# Patient Record
Sex: MALE | Race: OTHER | HISPANIC OR LATINO | URBAN - METROPOLITAN AREA
[De-identification: names, ages, dates, MRNs, and addresses within clinical notes are randomized per-mention and may not be internally consistent; named-entity substitution may affect disease eponyms.]

---

## 2021-06-20 ENCOUNTER — EMERGENCY (EMERGENCY)
Facility: HOSPITAL | Age: 56
LOS: 1 days | Discharge: HOME | End: 2021-06-20
Attending: EMERGENCY MEDICINE | Admitting: EMERGENCY MEDICINE
Payer: COMMERCIAL

## 2021-06-20 VITALS
DIASTOLIC BLOOD PRESSURE: 84 MMHG | WEIGHT: 141.1 LBS | HEART RATE: 106 BPM | RESPIRATION RATE: 18 BRPM | HEIGHT: 63 IN | OXYGEN SATURATION: 99 % | SYSTOLIC BLOOD PRESSURE: 126 MMHG | TEMPERATURE: 98 F

## 2021-06-20 VITALS — TEMPERATURE: 99 F

## 2021-06-20 DIAGNOSIS — R10.30 LOWER ABDOMINAL PAIN, UNSPECIFIED: ICD-10-CM

## 2021-06-20 DIAGNOSIS — E78.5 HYPERLIPIDEMIA, UNSPECIFIED: ICD-10-CM

## 2021-06-20 DIAGNOSIS — Z87.442 PERSONAL HISTORY OF URINARY CALCULI: ICD-10-CM

## 2021-06-20 DIAGNOSIS — Z20.822 CONTACT WITH AND (SUSPECTED) EXPOSURE TO COVID-19: ICD-10-CM

## 2021-06-20 DIAGNOSIS — M54.6 PAIN IN THORACIC SPINE: ICD-10-CM

## 2021-06-20 DIAGNOSIS — B34.9 VIRAL INFECTION, UNSPECIFIED: ICD-10-CM

## 2021-06-20 DIAGNOSIS — R50.9 FEVER, UNSPECIFIED: ICD-10-CM

## 2021-06-20 DIAGNOSIS — R30.0 DYSURIA: ICD-10-CM

## 2021-06-20 LAB
ALBUMIN SERPL ELPH-MCNC: 4 G/DL — SIGNIFICANT CHANGE UP (ref 3.3–5)
ALP SERPL-CCNC: 100 U/L — SIGNIFICANT CHANGE UP (ref 40–120)
ALT FLD-CCNC: 17 U/L — SIGNIFICANT CHANGE UP (ref 10–45)
ANION GAP SERPL CALC-SCNC: 13 MMOL/L — SIGNIFICANT CHANGE UP (ref 5–17)
APPEARANCE UR: CLEAR — SIGNIFICANT CHANGE UP
AST SERPL-CCNC: 19 U/L — SIGNIFICANT CHANGE UP (ref 10–40)
BACTERIA # UR AUTO: PRESENT /HPF
BASOPHILS # BLD AUTO: 0.02 K/UL — SIGNIFICANT CHANGE UP (ref 0–0.2)
BASOPHILS NFR BLD AUTO: 0.2 % — SIGNIFICANT CHANGE UP (ref 0–2)
BILIRUB SERPL-MCNC: 0.4 MG/DL — SIGNIFICANT CHANGE UP (ref 0.2–1.2)
BILIRUB UR-MCNC: NEGATIVE — SIGNIFICANT CHANGE UP
BUN SERPL-MCNC: 12 MG/DL — SIGNIFICANT CHANGE UP (ref 7–23)
CALCIUM SERPL-MCNC: 8 MG/DL — LOW (ref 8.4–10.5)
CHLORIDE SERPL-SCNC: 102 MMOL/L — SIGNIFICANT CHANGE UP (ref 96–108)
CO2 SERPL-SCNC: 21 MMOL/L — LOW (ref 22–31)
COLOR SPEC: YELLOW — SIGNIFICANT CHANGE UP
COMMENT - URINE: SIGNIFICANT CHANGE UP
CREAT SERPL-MCNC: 0.83 MG/DL — SIGNIFICANT CHANGE UP (ref 0.5–1.3)
DIFF PNL FLD: ABNORMAL
EOSINOPHIL # BLD AUTO: 0.02 K/UL — SIGNIFICANT CHANGE UP (ref 0–0.5)
EOSINOPHIL NFR BLD AUTO: 0.2 % — SIGNIFICANT CHANGE UP (ref 0–6)
EPI CELLS # UR: SIGNIFICANT CHANGE UP /HPF (ref 0–5)
GLUCOSE SERPL-MCNC: 110 MG/DL — HIGH (ref 70–99)
GLUCOSE UR QL: NEGATIVE — SIGNIFICANT CHANGE UP
HCT VFR BLD CALC: 40.5 % — SIGNIFICANT CHANGE UP (ref 39–50)
HGB BLD-MCNC: 13.1 G/DL — SIGNIFICANT CHANGE UP (ref 13–17)
IMM GRANULOCYTES NFR BLD AUTO: 0.4 % — SIGNIFICANT CHANGE UP (ref 0–1.5)
KETONES UR-MCNC: NEGATIVE — SIGNIFICANT CHANGE UP
LEUKOCYTE ESTERASE UR-ACNC: NEGATIVE — SIGNIFICANT CHANGE UP
LIDOCAIN IGE QN: 26 U/L — SIGNIFICANT CHANGE UP (ref 7–60)
LYMPHOCYTES # BLD AUTO: 1.04 K/UL — SIGNIFICANT CHANGE UP (ref 1–3.3)
LYMPHOCYTES # BLD AUTO: 9.9 % — LOW (ref 13–44)
MCHC RBC-ENTMCNC: 29 PG — SIGNIFICANT CHANGE UP (ref 27–34)
MCHC RBC-ENTMCNC: 32.3 GM/DL — SIGNIFICANT CHANGE UP (ref 32–36)
MCV RBC AUTO: 89.6 FL — SIGNIFICANT CHANGE UP (ref 80–100)
MONOCYTES # BLD AUTO: 0.38 K/UL — SIGNIFICANT CHANGE UP (ref 0–0.9)
MONOCYTES NFR BLD AUTO: 3.6 % — SIGNIFICANT CHANGE UP (ref 2–14)
NEUTROPHILS # BLD AUTO: 9.01 K/UL — HIGH (ref 1.8–7.4)
NEUTROPHILS NFR BLD AUTO: 85.7 % — HIGH (ref 43–77)
NITRITE UR-MCNC: NEGATIVE — SIGNIFICANT CHANGE UP
NRBC # BLD: 0 /100 WBCS — SIGNIFICANT CHANGE UP (ref 0–0)
PH UR: 6 — SIGNIFICANT CHANGE UP (ref 5–8)
PLATELET # BLD AUTO: 157 K/UL — SIGNIFICANT CHANGE UP (ref 150–400)
POTASSIUM SERPL-MCNC: 3.6 MMOL/L — SIGNIFICANT CHANGE UP (ref 3.5–5.3)
POTASSIUM SERPL-SCNC: 3.6 MMOL/L — SIGNIFICANT CHANGE UP (ref 3.5–5.3)
PROT SERPL-MCNC: 6.7 G/DL — SIGNIFICANT CHANGE UP (ref 6–8.3)
PROT UR-MCNC: ABNORMAL MG/DL
RBC # BLD: 4.52 M/UL — SIGNIFICANT CHANGE UP (ref 4.2–5.8)
RBC # FLD: 13 % — SIGNIFICANT CHANGE UP (ref 10.3–14.5)
RBC CASTS # UR COMP ASSIST: < 5 /HPF — SIGNIFICANT CHANGE UP
SARS-COV-2 RNA SPEC QL NAA+PROBE: NEGATIVE — SIGNIFICANT CHANGE UP
SODIUM SERPL-SCNC: 136 MMOL/L — SIGNIFICANT CHANGE UP (ref 135–145)
SP GR SPEC: >=1.03 — SIGNIFICANT CHANGE UP (ref 1–1.03)
UROBILINOGEN FLD QL: 0.2 E.U./DL — SIGNIFICANT CHANGE UP
WBC # BLD: 10.51 K/UL — HIGH (ref 3.8–10.5)
WBC # FLD AUTO: 10.51 K/UL — HIGH (ref 3.8–10.5)
WBC UR QL: < 5 /HPF — SIGNIFICANT CHANGE UP

## 2021-06-20 PROCEDURE — 99284 EMERGENCY DEPT VISIT MOD MDM: CPT

## 2021-06-20 PROCEDURE — 85025 COMPLETE CBC W/AUTO DIFF WBC: CPT

## 2021-06-20 PROCEDURE — 99284 EMERGENCY DEPT VISIT MOD MDM: CPT | Mod: 25

## 2021-06-20 PROCEDURE — 96374 THER/PROPH/DIAG INJ IV PUSH: CPT | Mod: XU

## 2021-06-20 PROCEDURE — 80053 COMPREHEN METABOLIC PANEL: CPT

## 2021-06-20 PROCEDURE — 96361 HYDRATE IV INFUSION ADD-ON: CPT

## 2021-06-20 PROCEDURE — 87086 URINE CULTURE/COLONY COUNT: CPT

## 2021-06-20 PROCEDURE — 87635 SARS-COV-2 COVID-19 AMP PRB: CPT

## 2021-06-20 PROCEDURE — 36415 COLL VENOUS BLD VENIPUNCTURE: CPT

## 2021-06-20 PROCEDURE — 74177 CT ABD & PELVIS W/CONTRAST: CPT

## 2021-06-20 PROCEDURE — 74177 CT ABD & PELVIS W/CONTRAST: CPT | Mod: 26,MA

## 2021-06-20 PROCEDURE — 81001 URINALYSIS AUTO W/SCOPE: CPT

## 2021-06-20 PROCEDURE — 83690 ASSAY OF LIPASE: CPT

## 2021-06-20 RX ORDER — ACETAMINOPHEN 500 MG
650 TABLET ORAL ONCE
Refills: 0 | Status: COMPLETED | OUTPATIENT
Start: 2021-06-20 | End: 2021-06-20

## 2021-06-20 RX ORDER — SODIUM CHLORIDE 9 MG/ML
1000 INJECTION INTRAMUSCULAR; INTRAVENOUS; SUBCUTANEOUS ONCE
Refills: 0 | Status: COMPLETED | OUTPATIENT
Start: 2021-06-20 | End: 2021-06-20

## 2021-06-20 RX ORDER — ONDANSETRON 8 MG/1
4 TABLET, FILM COATED ORAL ONCE
Refills: 0 | Status: COMPLETED | OUTPATIENT
Start: 2021-06-20 | End: 2021-06-20

## 2021-06-20 RX ORDER — IOHEXOL 300 MG/ML
30 INJECTION, SOLUTION INTRAVENOUS ONCE
Refills: 0 | Status: COMPLETED | OUTPATIENT
Start: 2021-06-20 | End: 2021-06-20

## 2021-06-20 RX ADMIN — SODIUM CHLORIDE 2000 MILLILITER(S): 9 INJECTION INTRAMUSCULAR; INTRAVENOUS; SUBCUTANEOUS at 20:54

## 2021-06-20 RX ADMIN — Medication 650 MILLIGRAM(S): at 22:53

## 2021-06-20 RX ADMIN — Medication 650 MILLIGRAM(S): at 23:25

## 2021-06-20 RX ADMIN — ONDANSETRON 4 MILLIGRAM(S): 8 TABLET, FILM COATED ORAL at 20:54

## 2021-06-20 RX ADMIN — SODIUM CHLORIDE 1000 MILLILITER(S): 9 INJECTION INTRAMUSCULAR; INTRAVENOUS; SUBCUTANEOUS at 22:00

## 2021-06-20 RX ADMIN — IOHEXOL 30 MILLILITER(S): 300 INJECTION, SOLUTION INTRAVENOUS at 20:54

## 2021-06-20 NOTE — ED PROVIDER NOTE - NSFOLLOWUPINSTRUCTIONS_ED_ALL_ED_FT
Fiebre en adultos    LO QUE NECESITA SABER:    ¿Qué es la fiebre?La fiebre es un aumento en la temperatura corporal. La temperatura normal del cuerpo es de 98.6°F (37°C). La temperatura se considera tremayne fiebre cuando alcanza más 100.4°F (38°C).    ¿Cuáles son las causas comunes de la fiebre?La causa de la fiebre podría desconocerse. A esto se le llama fiebre de origen desconocido. Ocurre cuando usted tiene fiebre por encima de los 100.9°F (38.3°C) por 3 semanas o más. Los siguientes son causas comunes de la fiebre:   •Tremayne infección causada por un virus o tremayne bacteria.      •Un trastornos inflamatorio, gerardo la artritis.      •Tremayne infección o tremayne lesión cerebral.      •Consumo de alcohol o drogas ilegales, o abstinencia.      ¿Qué otros signos y síntomas podría tener?  •Escalofríos y estremecimientos      •Rigidez muscular      •Pérdida de peso      •Sudoración nocturna      •Fiebre que viene y va      •Fiebre más tamera por las mañanas      ¿Cómo se diagnostica la causa de la fiebre?Juan médico le preguntará cuando empezó la fiebre y qué delgado tamera estuvo. Él le hará preguntas sobre otros síntomas y lo examinará para detectar signos de infección. [place] juan baldev para buscar protuberancias y escuchará juan corazón y rose marie pulmones. Infórmele al médico si se ha sometido a tremayne cirugía o ha tenido alguna infección recientemente. Comuníquele también si padece alguna afección médica, gerardo diabetes o artritis. Es posible que usted necesite exámenes de calvin o de orina para determinar si tiene tremayne infección. Pregunte por otros exámenes que usted podría necesitar si rose marie exámenes de calvin y de orina no explican la causa de juan fiebre.    ¿Cómo se trata la fiebre?Es probable que usted necesite alguno de los siguientes, según la causa de la fiebre:   •Los MAYNOR,gerardo el ibuprofeno, ayudan a disminuir la inflamación, el dolor y la fiebre. Tory medicamento está disponible con o sin tremayne receta médica. Los MAYNOR pueden causar sangrado estomacal o problemas renales en ciertas personas. Si usted esta tomando un anticoágulante,  siempre pregunte si los AINEs son seguros para usted. Siempre otilio la etiqueta de tory medicamento y siga las instrucciones. No administre tory medicamento a niños menores de 6 meses de lena sin antes obtener la autorización de juan médico.      •Acetaminofénalivia el dolor y baja la fiebre. Está disponible sin receta médica. Pregunte la cantidad y la frecuencia con que debe tomarlos. Siga las indicaciones. Otilio las etiquetas de todos los demás medicamentos que esté usando para saber si también contienen acetaminofén, o pregunte a juan médico o farmacéutico. El acetaminofén puede causar daño en el hígado cuando no se boom de forma correcta. No use más de 4 gramos (4000 miligramos) en total de acetaminofeno en un día.      •Los antibióticosse puede administrar si tiene tremayne infección a causa de alguna bacteria.      ¿Qué puedo hacer para sentirme mejor cuando tengo fiebre?  •[place] más líquidos gerardo se le indique.La fiebre lo hace sudar. [place] puede aumentar juan riesgo de presentar deshidratación. Los líquidos pueden ayudar a evitar la deshidratación. ?Annalise por lo menos de 6 a 8 vasos de ocho onzas de líquidos myrna cada día. Annalise agua, jugo o caldo. No annalise bebidas deportivas. Pueden contener cafeína.      ?Es posible que necesite tremayne solución de sales de rehidratación oral (SRO o chato oral). Tremayne SRO tiene las cantidades exactas de agua, sal y azúcar que usted necesita para reemplazar los líquidos corporales.      •Vístase con ropa ligera y fresca.Los temblores podrían ser tremayne señal de que la fiebre está aumentando. No ponga más cobijas encima de usted ni se abrigue más. [place] podría provocar que le suba la fiebre aún más. Vístase con ropa liviana y cómoda. Utilice tremayne manta liviana o tremayne sábana al dormir. Cambie juan ropa, las cobijas o las sábanas si se mojan.      •Refrésquese de manera brar.Báñese con agua tibia o fresca. Use tremayne bolsa de hielo envuelta en tremayne toalla pequeña o moje un paño con agua fría. Coloque la compresa de hielo o pañuelo húmedo sobre juan frente o en la parte posterior del baldev.      ¿Cuándo nehal buscar atención inmediata?  •Juan fiebre no desaparece, o empeora aún después del tratamiento.      •Usted tiene el baldev rígido y mucho dolor de malcolm.      •Usted está confundido. Es probable que no pueda pensar claramente o recordar cosas de la manera habitual.      •Juan corazón late más rápido de lo normal incluso después del tratamiento.      •Usted tiene falta de aliento o tiene dolor de pecho cuando respira.      •Usted orina pequeñas cantidades o no orina nada en absoluto.      •Juan piel, labios o uñas se ponen azules.      ¿Cuándo nehal comunicarme con mi médico?  •Usted tiene dolor en el abdomen o se siente hinchado.      •Tiene náuseas o está vomitando.      •Siente dolor o ardor cuando orina o tiene dolor en la espalda.      •Usted tiene preguntas o inquietudes acerca de juan condición o cuidado.

## 2021-06-20 NOTE — ED PROVIDER NOTE - OBJECTIVE STATEMENT
54 y/o M w/ PMHx of border HLD, and prior kidney stones presents to the ED for evaluation after 2 days of subjective fevers and chills, headache, upper back pain, lower abdominal pain, some dysuria, and diffuse myalgia. Denies any urinary frequency, and hematuria. Pt states that at times has intermittent abdominal pain diffusely favoring right side. Pt is concerned that he is having recurrent kidney stones onset of being sick. Pt states that he has never had this before and denies upper respiratory infection. Pt completed covid vaccination 3 months ago. 54 y/o M w/ PMHx of borderline HLD, and prior kidney stones presents to the ED for evaluation after 2 days of subjective fevers and chills, headache, upper back pain, lower abdominal pain, some dysuria, and diffuse myalgia. Denies any urinary frequency, and hematuria. Pt states that at times has intermittent abdominal pain diffusely favoring right side. Pt is concerned that he is having recurrent kidney stones onset of being sick. Pt states that he has never had this before and denies upper respiratory infection. Pt completed covid vaccination 3 months ago.

## 2021-06-20 NOTE — ED ADULT NURSE NOTE - OBJECTIVE STATEMENT
PT is a 54 y/o male A&Ox4 in NAD ambulatory with steady gait c/o chills, generalized abdominal pain and lower back pain. PT denies N/V/D, fever, chest pain, SOB. Pt endorses Covid vaccinated. Pt talking in clear, full sentences, respirations even and unlabored.

## 2021-06-20 NOTE — ED ADULT NURSE NOTE - DATE OF LAST VACCINATION
Crescentic Intermediate Repair Preamble Text (Leave Blank If You Do Not Want): Undermining was performed with blunt dissection. 20-Mar-2021

## 2021-06-20 NOTE — ED PROVIDER NOTE - CLINICAL SUMMARY MEDICAL DECISION MAKING FREE TEXT BOX
56 y/o M w/ PMHx of border HLD, and prior kidney stones presents to the ED for evaluation after 2 days of subjective fevers and chills, headache, upper back pain, lower abdominal pain, some dysuria, and diffuse myalgia. Differential includes viral syndrome vs UTI. Will check labs, hydrate, and check CT to r/u obstructing stone and appendicitis given right sided abdominal pain. 54 y/o M w/ PMHx of borderline HLD, and prior kidney stones presents to the ED for evaluation after 2 days of subjective fevers and chills, headache, upper back pain, lower abdominal pain, some dysuria, and diffuse myalgia. Differential includes viral syndrome vs UTI. Will check labs, hydrate, and check CT to r/u obstructing stone and appendicitis given intermittent right sided abdominal pain.

## 2021-06-20 NOTE — ED ADULT TRIAGE NOTE - OTHER COMPLAINTS
pt here for chills, headache and abd pain since last night, did not temp at home, denies any n/v/d, vaccinated for covid, report hx of HLD and kidney stones only, did not take any pain/fever meds today, well appearing in triage

## 2021-06-20 NOTE — ED PROVIDER NOTE - PATIENT PORTAL LINK FT
You can access the FollowMyHealth Patient Portal offered by [facility] by registering at the following website: [email or web address]. By joining [employer]’s FollowMyHealth portal, you will also be able to view your health information using other applications (apps) compatible with our system.

## 2021-06-22 LAB
CULTURE RESULTS: NO GROWTH — SIGNIFICANT CHANGE UP
SPECIMEN SOURCE: SIGNIFICANT CHANGE UP

## 2023-04-30 ENCOUNTER — EMERGENCY (EMERGENCY)
Facility: HOSPITAL | Age: 58
LOS: 1 days | Discharge: HOME | End: 2023-04-30
Attending: EMERGENCY MEDICINE | Admitting: EMERGENCY MEDICINE
Payer: COMMERCIAL

## 2023-04-30 VITALS
TEMPERATURE: 98 F | SYSTOLIC BLOOD PRESSURE: 144 MMHG | DIASTOLIC BLOOD PRESSURE: 96 MMHG | WEIGHT: 139.99 LBS | OXYGEN SATURATION: 96 % | RESPIRATION RATE: 16 BRPM | HEART RATE: 83 BPM | HEIGHT: 63 IN

## 2023-04-30 DIAGNOSIS — Z87.442 PERSONAL HISTORY OF URINARY CALCULI: ICD-10-CM

## 2023-04-30 DIAGNOSIS — M79.662 PAIN IN LEFT LOWER LEG: ICD-10-CM

## 2023-04-30 PROCEDURE — 93971 EXTREMITY STUDY: CPT | Mod: 26,LT

## 2023-04-30 PROCEDURE — 93971 EXTREMITY STUDY: CPT

## 2023-04-30 PROCEDURE — 99284 EMERGENCY DEPT VISIT MOD MDM: CPT | Mod: 25

## 2023-04-30 PROCEDURE — 99284 EMERGENCY DEPT VISIT MOD MDM: CPT

## 2023-04-30 NOTE — ED ADULT NURSE NOTE - OBJECTIVE STATEMENT
Patient a+o x4 c/o b/l leg pain x 1 week, states right more than left. States occurred before but had resolved on its own. Maintaining patent airway, denies sob/cp/dizziness/n/v/fever/chills. Denies cardiac hx, "I only take vitamins, no medications".

## 2023-04-30 NOTE — ED ADULT TRIAGE NOTE - CHIEF COMPLAINT QUOTE
Point of Care Ultrasound Vascular Access Pt presents with c/o atraumatic, leg pain" x one week, rt>left.

## 2023-04-30 NOTE — ED PROVIDER NOTE - PATIENT PORTAL LINK FT
You can access the FollowMyHealth Patient Portal offered by [facility] by registering at the following website: [email or web address]. By joining [employer]’s FollowMyHealth portal, you will also be able to view your health information using other applications (apps) compatible with our system.

## 2023-04-30 NOTE — ED PROVIDER NOTE - NSFOLLOWUPINSTRUCTIONS_ED_ALL_ED_FT
Fue evaluado en el Departamento de Emergencias (ED) por dolor en la pierna izquierda, sin lesión. Tu examen fue normal. Le hicieron tremayne ecografía de la pierna que no mostró ningún coágulo de calvin. Consulte a juan médico de cabecera para tremayne evaluación adicional. [place] tus piernas cuando estés en casa al final del día. Considere usar medias de compresión si está de pie todo el día. Regrese al servicio de urgencias por dolor intenso, decoloración de la pierna, sarpullido asociado, entumecimiento, debilidad, fiebre u otros síntomas preocupantes.    You were evaluated in the Emergency Department (ED) for left leg pain, without injury. Your exam was normal. You had a leg ultrasound which did not show any blood clots. See your primary medical doctor for further evaluation. Elevate you legs when home at the end of the day. Consider using compression stockings if standing all day. Return to the ED for severe pain, discoloration of the leg, associated rash, numbness, weakness, fever, or other concerning symptoms.     Calambres en las piernas  Leg Cramps  Los calambres en las piernas se producen cuando helen o más músculos se tensionan, y la persona no lo puede controlar (contracción muscular involuntaria). Los calambres musculares son más frecuentes en los músculos de la pantorrilla de la pierna. Pueden ocurrir al hacer ejercicio o al estar en reposo. Los calambres en las piernas son dolorosos y pueden durar de unos segundos a unos minutos. Es posible que aparezcan varias veces antes de detenerse finalmente.    Por lo general, la causa de los calambres en las piernas no es un problema médico grave. En muchos de los casos, se desconoce la causa. Algunas causas frecuentes son las siguientes:  Esfuerzo físico excesivo (sobrecarga) gerardo kimberly tremayne actividad física extenuante.  Repetir el mismo movimiento tremayne y otra vez.  Permanecer en determinada posición kimberly un período prolongado.  Preparación, técnica o método inadecuados al practicar un deporte o hacer tremayne actividad.  Deshidratación.  Lesiones.  Efectos secundarios de determinados medicamentos.  Niveles anormalmente bajos de minerales en la calvin (electrolitos), en especial, de potasio y calcio. [place] puede ser consecuencia de:  Embarazo.  Belkys medicamentos diuréticos.  Siga estas instrucciones en juan casa:  Comida y bebida    Beber suficiente líquido gerardo para mantener la orina de color amarillo pálido. Mantenerse hidratado puede ayudar a evitar los calambres.  Consuma tremayne dieta saludable que incluya gran cantidad de nutrientes para ayudar al funcionamiento de los músculos. Tremayne dieta saludable incluye frutas y verduras, proteínas magras, cereales integrales y productos lácteos descremados o semidescremados.  Control del dolor, la rigidez y la hinchazón        Intente masajear, estirar y relajar el músculo afectado. Hágalo kimberly varios minutos.  Si se lo indican, aplique hielo en las zonas con molestias o li después de un calambre. Para hacer esto:  Ponga el hielo en tremayne bolsa plástica.  Coloque tremayne toalla entre la piel y la bolsa.  Aplique el hielo kimberly 20 minutos, 2 o 3 veces por día.  Retire el hielo si la piel se pone de color shabazz brillante. [place] es muy importante. Si no puede sentir dolor, calor o frío, tiene un mayor riesgo de que se dañe la good.  Si se lo indican, aplique calor en los músculos que están tensos o tirantes. Judy esto antes de hacer ejercicio o con la frecuencia que le haya indicado el médico. Use la syd de calor que el médico le recomiende, gerardo tremayne compresa de calor húmedo o tremayne almohadilla térmica. Para hacer esto:  Coloque tremayne toalla entre la piel y la syd de calor.  Aplique calor kimberly 20 a 30 minutos.  Retire la syd de calor si la piel se pone de color shabazz brillante. [place] es especialmente importante si no puede sentir dolor, calor o frío. Puede correr un riesgo mayor de sufrir quemaduras.  Intente belkys duchas o rylie con [place] para ayudar a relajar los músculos tirantes.  Indicaciones generales    Si tiene calambres en las piernas con frecuencia, evite el ejercicio intenso kimberly varios días.  Use los medicamentos de venta chava y los recetados solamente gerardo se lo haya indicado el médico.  [place]um[place] con todas las visitas de seguimiento. [place] es importante.  Comuníquese con un médico si:  Los calambres en las piernas se vuelven más intensos o más frecuentes, o no mejoran con el tiempo.  Tiene el pie frío, adormecido o de color joy.  Resumen  Los calambres musculares pueden aparecer en cualquier músculo, sadiq el lugar más frecuente es en los músculos de la pantorrilla.  Los calambres en las piernas son dolorosos y pueden durar de unos segundos a unos minutos.  Por lo general, la causa de los calambres en las piernas no es un problema médico grave. Generalmente, no se conoce la causa.  Permanezca hidratado y tome los medicamentos de venta chava y recetados solamente gerardo se lo haya indicado el médico.  Esta información no tiene gerardo fin reemplazar el consejo del médico. Asegúrese de hacerle al médico cualquier pregunta que tenga.    Document Revised: 07/09/2021 Document Reviewed: 07/09/2021

## 2023-04-30 NOTE — ED PROVIDER NOTE - PHYSICAL EXAMINATION
Constitutional: Well appearing, awake, alert, oriented to person, place, time/situation and in no apparent distress.  ENMT: Airway patent. Normal MM  Eyes: Clear bilaterally  Cardiac: Normal rate, regular rhythm.  Heart sounds S1, S2.  No murmurs, rubs or gallops.  Respiratory: Breaths sounds equal and clear b/l. No increased WOB, tachypnea, hypoxia, or accessory mm use. Pt speaks in full sentences.   Musculoskeletal: Range of motion is not limited. legs symmetric in size. no changes in calor or pallor. no wounds nor rashes. no deformities. + mild posterior leg ttp.   Vasc: 2+ pedal pulses, normal motor / sensation   Neuro: Alert and oriented x 3, face symmetric and speech fluent. Strength 5/5 x 4 ext and symmetric, nml gross motor movement, nml gait. No focal deficits noted.  Skin: Skin normal color for race, warm, dry and intact. No evidence of rash.  Psych: Alert and oriented to person, place, time/situation. normal mood and affect. no apparent risk to self or others. Constitutional: Well appearing, awake, alert, oriented to person, place, time/situation and in no apparent distress.  ENMT: Airway patent. Normal MM  Eyes: Clear bilaterally  Cardiac: Normal rate, regular rhythm.  Heart sounds S1, S2.  No murmurs, rubs or gallops.  Respiratory: Breaths sounds equal and clear b/l. No increased WOB, tachypnea, hypoxia, or accessory mm use. Pt speaks in full sentences.   Musculoskeletal: Range of motion is not limited. legs symmetric in size. no changes in calor or pallor. no wounds nor rashes. no deformities. + mild posterior leg ttp. 5/5 mm strength in all mm groups. normal gait  Vasc: 2+ pedal pulses, 2+ femoral pulse, normal motor / sensation   Neuro: Alert and oriented x 3, face symmetric and speech fluent. Strength 5/5 x 4 ext and symmetric, nml gross motor movement, nml gait. No focal deficits noted.  Skin: Skin normal color for race, warm, dry and intact. No evidence of rash.  Psych: Alert and oriented to person, place, time/situation. normal mood and affect. no apparent risk to self or others.

## 2023-04-30 NOTE — ED PROVIDER NOTE - OBJECTIVE STATEMENT
Pt w/ no sign PMHx, no PSHx p/w 1 week of LLE pain, atraumatic, more so in the back of the leg. No swelling. No known injury. Pt w/ no sign PMHx, no PSHx p/w 1 week of LLE pain, atraumatic, more so in the back of the leg. No swelling. No known injury. No recent travel / immobilization / surgery. No hx PE / DVT. No back pain pain. Pt w/ no sign PMHx, no PSHx p/w 1 week of LLE pain, atraumatic, more so in the back of the leg. No swelling. No known injury. No recent travel / immobilization / surgery. No hx PE / DVT. No back pain. No numbness/tingling nor weakness. No f/c. No bowel nor bladder dysfunction

## 2023-04-30 NOTE — ED PROVIDER NOTE - CLINICAL SUMMARY MEDICAL DECISION MAKING FREE TEXT BOX
Atraumatic diffuse LLE pain w/o back pain. No numbness/tingling / weakness. No f/c. Minimally tender. NVI. DDx includes but not limited to leg cramps, MSK pain, less likely DVT, other pathology. Check LE doppler.

## 2023-04-30 NOTE — ED PROVIDER NOTE - PROGRESS NOTE DETAILS
D/w pt neg results, unclear source of pain. Leg elevation, compression stocking, motrin / tylenol, f/u PCP, return precautions all discussed

## 2023-04-30 NOTE — ED PROVIDER NOTE - NS ED ROS FT
Constitutional: No fever or chills.   Cardiac: No chest pain, SOB or edema. No chest pain with exertion.  Respiratory: No cough or respiratory distress. No hemoptysis. No history of asthma or RAD.  MS: See HPI  Neuro: No numbness or weakness.   Skin: No skin rash.   Except as documented in the HPI, all other systems are negative.

## 2023-05-01 PROBLEM — N20.0 CALCULUS OF KIDNEY: Chronic | Status: ACTIVE | Noted: 2021-06-20

## 2024-01-22 ENCOUNTER — EMERGENCY (EMERGENCY)
Facility: HOSPITAL | Age: 59
LOS: 1 days | Discharge: HOME | End: 2024-01-22
Admitting: STUDENT IN AN ORGANIZED HEALTH CARE EDUCATION/TRAINING PROGRAM
Payer: COMMERCIAL

## 2024-01-22 VITALS
HEART RATE: 100 BPM | WEIGHT: 139.99 LBS | OXYGEN SATURATION: 95 % | DIASTOLIC BLOOD PRESSURE: 77 MMHG | TEMPERATURE: 100 F | HEIGHT: 63 IN | RESPIRATION RATE: 18 BRPM | SYSTOLIC BLOOD PRESSURE: 133 MMHG

## 2024-01-22 DIAGNOSIS — Z20.822 CONTACT WITH AND (SUSPECTED) EXPOSURE TO COVID-19: ICD-10-CM

## 2024-01-22 DIAGNOSIS — J06.9 ACUTE UPPER RESPIRATORY INFECTION, UNSPECIFIED: ICD-10-CM

## 2024-01-22 DIAGNOSIS — R05.9 COUGH, UNSPECIFIED: ICD-10-CM

## 2024-01-22 LAB
FLUAV AG NPH QL: DETECTED
FLUBV AG NPH QL: SIGNIFICANT CHANGE UP
RSV RNA NPH QL NAA+NON-PROBE: SIGNIFICANT CHANGE UP
SARS-COV-2 RNA SPEC QL NAA+PROBE: SIGNIFICANT CHANGE UP

## 2024-01-22 PROCEDURE — 71046 X-RAY EXAM CHEST 2 VIEWS: CPT

## 2024-01-22 PROCEDURE — 87637 SARSCOV2&INF A&B&RSV AMP PRB: CPT

## 2024-01-22 PROCEDURE — 99284 EMERGENCY DEPT VISIT MOD MDM: CPT

## 2024-01-22 PROCEDURE — 71046 X-RAY EXAM CHEST 2 VIEWS: CPT | Mod: 26

## 2024-01-22 PROCEDURE — 99283 EMERGENCY DEPT VISIT LOW MDM: CPT | Mod: 25

## 2024-01-22 RX ORDER — ACETAMINOPHEN 500 MG
975 TABLET ORAL ONCE
Refills: 0 | Status: COMPLETED | OUTPATIENT
Start: 2024-01-22 | End: 2024-01-22

## 2024-01-22 RX ADMIN — Medication 975 MILLIGRAM(S): at 20:52

## 2024-01-22 NOTE — ED PROVIDER NOTE - PHYSICAL EXAMINATION
CONSTITUTIONAL: Well-appearing;  in no apparent distress.   HEAD: Normocephalic; atraumatic.   EYES: PERRL; EOM intact; conjunctiva and sclera clear  ENT: normal nose; no rhinorrhea; normal pharynx with no erythema or lesions.   NECK: Supple; non-tender;   CARDIOVASCULAR: rrr,  RESPIRATORY: Breathing easily; cta b/l   MSK: FROM at all extremities, normal tone   EXT: No cyanosis or edema; N/V intact  SKIN: Normal for age and race; warm; dry; good turgor; no apparent lesions or rash.

## 2024-01-22 NOTE — ED PROVIDER NOTE - PATIENT PORTAL LINK FT
You can access the FollowMyHealth Patient Portal offered by [facility] by registering at the following website: [email or web address]. By joining [employer]’s FollowMyHealth portal, you will also be able to view your health information using other applications (apps) compatible with our system.

## 2024-01-22 NOTE — ED ADULT NURSE NOTE - NSFALLUNIVINTERV_ED_ALL_ED
Bed/Stretcher in lowest position, wheels locked, appropriate side rails in place/Call bell, personal items and telephone in reach/Instruct patient to call for assistance before getting out of bed/chair/stretcher/Non-slip footwear applied when patient is off stretcher/[place] to call system/Physically safe environment - no spills, clutter or unnecessary equipment/Purposeful proactive rounding/Room/bathroom lighting operational, light cord in reach

## 2024-01-22 NOTE — ED ADULT NURSE NOTE - OBJECTIVE STATEMENT
Pt A&Ox4 presents to ED with flu-like complaints x 4 days. Pt endorses fevers, chills, body aches, and headaches x 4 days. Denies chest pain, shortness of breath, fevers/chills, nausea/vomiting, dizziness. Pt A&Ox4 presents to ED with flu-like complaints x 4 days. Pt endorses fevers, chills, body aches, and headaches x 4 days. Denies chest pain, shortness of breath, nausea/vomiting, dizziness.

## 2024-01-22 NOTE — ED PROVIDER NOTE - OBJECTIVE STATEMENT
59 yo m with no pmh c/o cough, fever, chills, ha, bodyaches x 4 days. Reports pain in chest and back with coughing. Denies n/v/d, sob, recent travel or sick contacts.

## 2024-01-22 NOTE — ED PROVIDER NOTE - NSFOLLOWUPINSTRUCTIONS_ED_ALL_ED_FT
Viral Respiratory Infection    A viral respiratory infection is an illness that affects parts of the body used for breathing, like the lungs, nose, and throat. It is caused by a germ called a virus. Symptoms can include runny nose, coughing, sneezing, fatigue, body aches, sore throat, fever, or headache. Over the counter medicine can be used to manage the symptoms but the infection typically goes away on its own in 5 to 10 days.     SEEK IMMEDIATE MEDICAL CARE IF YOU HAVE ANY OF THE FOLLOWING SYMPTOMS: shortness of breath, chest pain, fever over 10 days, or lightheadedness/dizziness. Your preliminary results of your XR are normal. These will officially be read by the attending radiologist later today or tomorrow. If there are any abnormalities you will be called.      Viral Respiratory Infection    A viral respiratory infection is an illness that affects parts of the body used for breathing, like the lungs, nose, and throat. It is caused by a germ called a virus. Symptoms can include runny nose, coughing, sneezing, fatigue, body aches, sore throat, fever, or headache. Over the counter medicine can be used to manage the symptoms but the infection typically goes away on its own in 5 to 10 days.     SEEK IMMEDIATE MEDICAL CARE IF YOU HAVE ANY OF THE FOLLOWING SYMPTOMS: shortness of breath, chest pain, fever over 10 days, or lightheadedness/dizziness.

## 2024-01-22 NOTE — ED PROVIDER NOTE - CLINICAL SUMMARY MEDICAL DECISION MAKING FREE TEXT BOX
59 yo m with no pmh c/o cough, fever, chills, ha, bodyaches x 4 days. Reports pain in chest and back with coughing. Denies n/v/d, sob, recent travel or sick contacts. T 99.7, . Well appearing. Lungs cta b/l. CXR... likely viral uri, supportive care 57 yo m with no pmh c/o cough, fever, chills, ha, bodyaches x 4 days. Reports pain in chest and back with coughing. Denies n/v/d, sob, recent travel or sick contacts. T 99.7, . Well appearing. Lungs cta b/l. CXR clear  likely viral uri, supportive care

## 2024-02-17 ENCOUNTER — EMERGENCY (EMERGENCY)
Facility: HOSPITAL | Age: 59
LOS: 1 days | Discharge: HOME | End: 2024-02-17
Attending: EMERGENCY MEDICINE | Admitting: EMERGENCY MEDICINE
Payer: COMMERCIAL

## 2024-02-17 VITALS
HEIGHT: 63 IN | TEMPERATURE: 102 F | DIASTOLIC BLOOD PRESSURE: 83 MMHG | RESPIRATION RATE: 18 BRPM | SYSTOLIC BLOOD PRESSURE: 158 MMHG | HEART RATE: 115 BPM | WEIGHT: 139.99 LBS | OXYGEN SATURATION: 96 %

## 2024-02-17 DIAGNOSIS — R00.0 TACHYCARDIA, UNSPECIFIED: ICD-10-CM

## 2024-02-17 DIAGNOSIS — Z20.822 CONTACT WITH AND (SUSPECTED) EXPOSURE TO COVID-19: ICD-10-CM

## 2024-02-17 DIAGNOSIS — R05.9 COUGH, UNSPECIFIED: ICD-10-CM

## 2024-02-17 DIAGNOSIS — B34.9 VIRAL INFECTION, UNSPECIFIED: ICD-10-CM

## 2024-02-17 LAB
ANION GAP SERPL CALC-SCNC: 13 MMOL/L — SIGNIFICANT CHANGE UP (ref 5–17)
APPEARANCE UR: CLEAR — SIGNIFICANT CHANGE UP
BACTERIA # UR AUTO: NEGATIVE /HPF — SIGNIFICANT CHANGE UP
BASOPHILS # BLD AUTO: 0.02 K/UL — SIGNIFICANT CHANGE UP (ref 0–0.2)
BASOPHILS NFR BLD AUTO: 0.2 % — SIGNIFICANT CHANGE UP (ref 0–2)
BILIRUB UR-MCNC: NEGATIVE — SIGNIFICANT CHANGE UP
BUN SERPL-MCNC: 21 MG/DL — SIGNIFICANT CHANGE UP (ref 7–23)
CALCIUM SERPL-MCNC: 9.1 MG/DL — SIGNIFICANT CHANGE UP (ref 8.4–10.5)
CHLORIDE SERPL-SCNC: 103 MMOL/L — SIGNIFICANT CHANGE UP (ref 96–108)
CO2 SERPL-SCNC: 22 MMOL/L — SIGNIFICANT CHANGE UP (ref 22–31)
COLOR SPEC: YELLOW — SIGNIFICANT CHANGE UP
CREAT SERPL-MCNC: 0.88 MG/DL — SIGNIFICANT CHANGE UP (ref 0.5–1.3)
DIFF PNL FLD: ABNORMAL
EGFR: 101 ML/MIN/1.73M2 — SIGNIFICANT CHANGE UP
EOSINOPHIL # BLD AUTO: 0.02 K/UL — SIGNIFICANT CHANGE UP (ref 0–0.5)
EOSINOPHIL NFR BLD AUTO: 0.2 % — SIGNIFICANT CHANGE UP (ref 0–6)
FLUAV AG NPH QL: SIGNIFICANT CHANGE UP
FLUBV AG NPH QL: SIGNIFICANT CHANGE UP
GLUCOSE SERPL-MCNC: 132 MG/DL — HIGH (ref 70–99)
GLUCOSE UR QL: NEGATIVE MG/DL — SIGNIFICANT CHANGE UP
HCT VFR BLD CALC: 41.3 % — SIGNIFICANT CHANGE UP (ref 39–50)
HGB BLD-MCNC: 13.6 G/DL — SIGNIFICANT CHANGE UP (ref 13–17)
IMM GRANULOCYTES NFR BLD AUTO: 0.1 % — SIGNIFICANT CHANGE UP (ref 0–0.9)
KETONES UR-MCNC: NEGATIVE MG/DL — SIGNIFICANT CHANGE UP
LACTATE SERPL-SCNC: 0.5 MMOL/L — SIGNIFICANT CHANGE UP (ref 0.5–2)
LACTATE SERPL-SCNC: 2.4 MMOL/L — HIGH (ref 0.5–2)
LEUKOCYTE ESTERASE UR-ACNC: NEGATIVE — SIGNIFICANT CHANGE UP
LYMPHOCYTES # BLD AUTO: 0.6 K/UL — LOW (ref 1–3.3)
LYMPHOCYTES # BLD AUTO: 7 % — LOW (ref 13–44)
MCHC RBC-ENTMCNC: 29.7 PG — SIGNIFICANT CHANGE UP (ref 27–34)
MCHC RBC-ENTMCNC: 32.9 GM/DL — SIGNIFICANT CHANGE UP (ref 32–36)
MCV RBC AUTO: 90.2 FL — SIGNIFICANT CHANGE UP (ref 80–100)
MONOCYTES # BLD AUTO: 1.08 K/UL — HIGH (ref 0–0.9)
MONOCYTES NFR BLD AUTO: 12.7 % — SIGNIFICANT CHANGE UP (ref 2–14)
NEUTROPHILS # BLD AUTO: 6.79 K/UL — SIGNIFICANT CHANGE UP (ref 1.8–7.4)
NEUTROPHILS NFR BLD AUTO: 79.8 % — HIGH (ref 43–77)
NITRITE UR-MCNC: NEGATIVE — SIGNIFICANT CHANGE UP
NRBC # BLD: 0 /100 WBCS — SIGNIFICANT CHANGE UP (ref 0–0)
PH UR: 6 — SIGNIFICANT CHANGE UP (ref 5–8)
PLATELET # BLD AUTO: 144 K/UL — LOW (ref 150–400)
POTASSIUM SERPL-MCNC: 4 MMOL/L — SIGNIFICANT CHANGE UP (ref 3.5–5.3)
POTASSIUM SERPL-SCNC: 4 MMOL/L — SIGNIFICANT CHANGE UP (ref 3.5–5.3)
PROT UR-MCNC: SIGNIFICANT CHANGE UP MG/DL
RBC # BLD: 4.58 M/UL — SIGNIFICANT CHANGE UP (ref 4.2–5.8)
RBC # FLD: 13.3 % — SIGNIFICANT CHANGE UP (ref 10.3–14.5)
RBC CASTS # UR COMP ASSIST: 20 /HPF — HIGH (ref 0–4)
RSV RNA NPH QL NAA+NON-PROBE: SIGNIFICANT CHANGE UP
SARS-COV-2 RNA SPEC QL NAA+PROBE: SIGNIFICANT CHANGE UP
SODIUM SERPL-SCNC: 138 MMOL/L — SIGNIFICANT CHANGE UP (ref 135–145)
SP GR SPEC: 1.03 — SIGNIFICANT CHANGE UP (ref 1–1.03)
SQUAMOUS # UR AUTO: 1 /HPF — SIGNIFICANT CHANGE UP (ref 0–5)
UROBILINOGEN FLD QL: 1 MG/DL — SIGNIFICANT CHANGE UP (ref 0.2–1)
WBC # BLD: 8.52 K/UL — SIGNIFICANT CHANGE UP (ref 3.8–10.5)
WBC # FLD AUTO: 8.52 K/UL — SIGNIFICANT CHANGE UP (ref 3.8–10.5)
WBC UR QL: 1 /HPF — SIGNIFICANT CHANGE UP (ref 0–5)

## 2024-02-17 PROCEDURE — 80048 BASIC METABOLIC PNL TOTAL CA: CPT

## 2024-02-17 PROCEDURE — 96374 THER/PROPH/DIAG INJ IV PUSH: CPT

## 2024-02-17 PROCEDURE — 83605 ASSAY OF LACTIC ACID: CPT

## 2024-02-17 PROCEDURE — 99285 EMERGENCY DEPT VISIT HI MDM: CPT

## 2024-02-17 PROCEDURE — 87040 BLOOD CULTURE FOR BACTERIA: CPT

## 2024-02-17 PROCEDURE — 71046 X-RAY EXAM CHEST 2 VIEWS: CPT | Mod: 26

## 2024-02-17 PROCEDURE — 71046 X-RAY EXAM CHEST 2 VIEWS: CPT

## 2024-02-17 PROCEDURE — 81001 URINALYSIS AUTO W/SCOPE: CPT

## 2024-02-17 PROCEDURE — 85025 COMPLETE CBC W/AUTO DIFF WBC: CPT

## 2024-02-17 PROCEDURE — 87637 SARSCOV2&INF A&B&RSV AMP PRB: CPT

## 2024-02-17 PROCEDURE — 96361 HYDRATE IV INFUSION ADD-ON: CPT

## 2024-02-17 PROCEDURE — 99284 EMERGENCY DEPT VISIT MOD MDM: CPT | Mod: 25

## 2024-02-17 PROCEDURE — 36415 COLL VENOUS BLD VENIPUNCTURE: CPT

## 2024-02-17 RX ORDER — SODIUM CHLORIDE 9 MG/ML
1000 INJECTION INTRAMUSCULAR; INTRAVENOUS; SUBCUTANEOUS ONCE
Refills: 0 | Status: COMPLETED | OUTPATIENT
Start: 2024-02-17 | End: 2024-02-17

## 2024-02-17 RX ORDER — ACETAMINOPHEN 500 MG
975 TABLET ORAL ONCE
Refills: 0 | Status: COMPLETED | OUTPATIENT
Start: 2024-02-17 | End: 2024-02-17

## 2024-02-17 RX ORDER — KETOROLAC TROMETHAMINE 30 MG/ML
15 SYRINGE (ML) INJECTION ONCE
Refills: 0 | Status: DISCONTINUED | OUTPATIENT
Start: 2024-02-17 | End: 2024-02-17

## 2024-02-17 RX ADMIN — Medication 975 MILLIGRAM(S): at 21:15

## 2024-02-17 RX ADMIN — SODIUM CHLORIDE 1000 MILLILITER(S): 9 INJECTION INTRAMUSCULAR; INTRAVENOUS; SUBCUTANEOUS at 21:14

## 2024-02-17 RX ADMIN — SODIUM CHLORIDE 1000 MILLILITER(S): 9 INJECTION INTRAMUSCULAR; INTRAVENOUS; SUBCUTANEOUS at 22:14

## 2024-02-17 RX ADMIN — Medication 975 MILLIGRAM(S): at 22:14

## 2024-02-17 RX ADMIN — Medication 15 MILLIGRAM(S): at 22:14

## 2024-02-17 RX ADMIN — SODIUM CHLORIDE 1000 MILLILITER(S): 9 INJECTION INTRAMUSCULAR; INTRAVENOUS; SUBCUTANEOUS at 23:13

## 2024-02-17 RX ADMIN — Medication 15 MILLIGRAM(S): at 21:15

## 2024-02-17 NOTE — ED ADULT NURSE NOTE - CHPI ED NUR DURATION
Hospitalist Progress Note    NAME: Shady Lopez   :  1965   MRN:  [id number]       Assessment / Plan:  Acute metabolic encephalopathy  Status epilepticus  History of seizure disorder  Aspiration pneumonia  back to baseline  MRI brainno acute abnormality. CT headno acute abnormality. Lumbar puncture doneCSF reveals no infectious etiology so far. CSF culture negative. CSF HSV negative   CSF meningitis panel negative. Out side hospital EEG indication triphasic slowing and focal 3hz right fronto temproal field siezure activety. EEG done here shows no seizure. No more seizure reported on the continuous EEG. Continue Keppra along with Vimpat. S/p course of Levaquin  Consulted psychiatryrecommend Haldol as needed. Mental status appears to be at baseline  --Not hypoxic  --No rehab needs. CM working on safe discharge plan. Confirming if one of his relatives or fiancee will be able to help. End-stage renal disease on dialysis  Anemia. --Garcia removed  --c/w Retacrit  --still no signs of renal recovery. Arranging for outpatient hemodialysis    DM2 with episodes of hypoglycemia  -Increase Lantus to 15 units. Increase Premeal NovoLog 5 units     Migraine  Fioricet as needed. Hypertension  Hyperlipidemia  Polysubstance abuse  -c/w amlodipine      Body mass index is 22.55 kg/m². Estimated discharge date: January 10  Barriers: Renal function stabilize    Code status: Full  Prophylaxis: Lovenox  Recommended Disposition: SNF     Subjective:     Chief Complaint / Reason for Physician Visit  Follow up ESRD, DM, HTN      Objective:     VITALS:   Last 24hrs VS reviewed since prior progress note.  Most recent are:  Patient Vitals for the past 24 hrs:   Temp Pulse Resp BP SpO2   22 1000  65 16 (!) 141/94    22 0945  62 16 (!) 147/93    22 0937 97.7 °F (36.5 °C) 62 16 (!) 139/90    22 0750 98.2 °F (36.8 °C) 80 16 (!) 148/95 98 %   22 0420 97.6 °F (36.4 °C) 70 18  96 %   01/17/22 2142    (!) 146/86    01/17/22 2003 97.5 °F (36.4 °C) 66 18 132/77 96 %   01/17/22 1505 98.4 °F (36.9 °C) 67 16 (!) 145/65 97 %       Intake/Output Summary (Last 24 hours) at 1/18/2022 1101  Last data filed at 1/18/2022 0600  Gross per 24 hour   Intake    Output 1400 ml   Net -1400 ml        I had a face to face encounter and independently examined this patient on 1/18/2022, as outlined below:  PHYSICAL EXAM:  General: WD, WN. Awake, not alert, cooperative, no acute distress    EENT:  EOMI. Anicteric sclerae. MMM  Resp:  CTA bilaterally, no wheezing or rales. No accessory muscle use  CV:  Regular  rhythm,  No edema, permcath left chest wall  GI:  Soft, Non distended, Non tender. +Bowel sounds  Neurologic:  Awake and alert, does not appear confused, normal speech,   Psych:   Good insight. Not anxious nor agitated  Skin:  No rashes. No jaundice, multiple tatoos    Reviewed most current lab test results and cultures  YES  Reviewed most current radiology test results   YES  Review and summation of old records today    NO  Reviewed patient's current orders and MAR    YES  PMH/SH reviewed - no change compared to H&P  ________________________________________________________________________  Care Plan discussed with:    Comments   Patient x    Family      RN x    Care Manager     Consultant                       x Multidiciplinary team rounds were held today with , nursing, pharmacist and clinical coordinator. Patient's plan of care was discussed; medications were reviewed and discharge planning was addressed.      ________________________________________________________________________  Total NON critical care TIME:  10  Minutes    Total CRITICAL CARE TIME Spent:   Minutes non procedure based      Comments   >50% of visit spent in counseling and coordination of care     ________________________________________________________________________  Becca Santana MD Procedures: see electronic medical records for all procedures/Xrays and details which were not copied into this note but were reviewed prior to creation of Plan. LABS:  I reviewed today's most current labs and imaging studies.   Pertinent labs include:  Recent Labs     01/18/22  0940 01/17/22  0250   WBC 5.0 6.7   HGB 10.0* 9.3*   HCT 30.4* 28.5*    224     Recent Labs     01/18/22  0940 01/17/22  0250   * 129*   K 5.6* 5.4*   CL 91* 95*   CO2 28 27   * 359*   BUN 62* 61*   CREA 5.15* 4.73*   CA 9.0 8.9   PHOS 5.6* 5.5*   ALB 3.1* 3.0*       Signed: Escobar Casillas MD 4/day(s)

## 2024-02-17 NOTE — ED PROVIDER NOTE - CLINICAL SUMMARY MEDICAL DECISION MAKING FREE TEXT BOX
cough, fever, bodyaches, no resp distress, no hypoxia, lungs clear, no abd pain. likely viral syndrome  -check labs  -CXR  -ivf, tylenol, toradol  -viral swab

## 2024-02-17 NOTE — ED PROVIDER NOTE - OBJECTIVE STATEMENT
58M no PMH c/o feeling unwell for past few days. +cough, +fevers. +bodyaches. states last took tylenol around 1P. no vomiting, no abd pain. had similar symptoms a month ago and had the flu.

## 2024-02-17 NOTE — ED PROVIDER NOTE - EKG #1 DATE/TIME
17-Feb-2024 20:22 Principal Discharge DX:	Term birth of  female  Goal:	Routine  care  Instructions for follow-up, activity and diet:	- Follow-up with your pediatrician within 48 hours of discharge.     Routine Home Care Instructions:  - Please call us for help if you feel sad, blue or overwhelmed for more than a few days after discharge  - Umbilical cord care:        - Please keep your baby's cord clean and dry (do not apply alcohol)        - Please keep your baby's diaper below the umbilical cord until it has fallen off (~10-14 days)        - Please do not submerge your baby in a bath until the cord has fallen off (sponge bath instead)    - Continue feeding child on demand with the guideline of at least 8-12 feeds in a 24 hr period    Please contact your pediatrician and return to the hospital if you notice any of the following:   - Fever  (T > 100.4)  - Reduced amount of wet diapers (< 5-6 per day) or no wet diaper in 12 hours  - Increased fussiness, irritability, or crying inconsolably  - Lethargy (excessively sleepy, difficult to arouse)  - Breathing difficulties (noisy breathing, breathing fast, using belly and neck muscles to breath)  - Changes in the baby’s color (yellow, blue, pale, gray)  - Seizure or loss of consciousness  Secondary Diagnosis:	Renal agenesis, unilateral

## 2024-02-17 NOTE — ED ADULT NURSE NOTE - [facility], MLM
Patient Education     Nonspecific Vomiting and Diarrhea (Adult)  Vomiting and diarrhea can have many causes, including:  · Helping your body get rid of harmful substances   · Gastroenteritis caused by viruses, parasites, bacteria, or toxins.  · Allergy to or side effect of a food or medicine  · Severe stress or worry (anxiety)   · Other illnesses  · Pregnancy  It is often hard to pinpoint an exact cause, even with testing. Vomiting and diarrhea often go away within a day or two without problems. If they continue, though, they can lead to too much loss of fluid (dehydration). This can be serious if not treated.    Home care  Medicines  · You may use acetaminophen or NSAID medicines like ibuprofen or naproxen to control fever, unless another medicine was prescribed. If you have chronic liver or kidney disease, talk with your healthcare provider before using these medicines. Also talk with your provider if you've had a stomach ulcer or gastrointestinal bleeding. Don't give aspirin to anyone under 18 years of age who is ill with a fever because it may cause severe disease or death. Don't use NSAID medicines if you are already taking one for another condition (like arthritis) or are on aspirin (such as for heart disease or after a stroke)  · Over-the-counter medicines for diarrhea, nausea, and vomiting are generally OK unless you have bleeding, fever, or severe abdominal pain.  General care  · If symptoms are severe, rest at home for the next 24 hours, or until you are feeling better.  · Washing your hands with soap and water, or using alcohol-based hand  is the best way to stop the spread of infection. Wash your hands after touching anyone who is sick.  · Wash your hands after using the toilet and before meals. Clean the toilet after each use.  · Dry your hands with a single use towel.  · Caffeine, tobacco, and alcohol can make the diarrhea, cramping, and pain worse. Remember, caffeine not only is in coffee,  but also is in chocolate, some energy drinks, and teas.  Diet  · Water and clear liquids are important so you don't get dehydrated. Drink a small amount at a time. Don't guzzle down the drinks. That may increase your nausea, make cramping worse, and cause the drinks to come back up.  · Sports drinks may also help if you are healthy and not too dehydrated. They have too much sugar and not enough electrolytes and can sometimes make things worse. Also, don't drink beverages that are too acidic, like orange juice and grape juice.  · If you are very dehydrated, commercially available products called oral rehydration solutions are best.  Food  · Don't force yourself to eat, especially if you have cramps, diarrhea, or vomiting. Eat just a little at a time, and then wait a few minutes before you try to eat more.  · Don't eat fatty, greasy, spicy, or fried foods.  · Don't eat dairy products if you have diarrhea. They can make it worse.  During the first 24 hours (the first full day), follow the diet below:  · Beverages: Oral rehydration solutions, sports drinks, soft drinks without caffeine, mineral water, and decaffeinated tea and coffee  · Soups: Clear broth, consommé, and bouillon  · Desserts: Plain gelatin, popsicles, and fruit juice bars  During the next 24 hours (the second day), you may add the following to the above if you are better. If not, continue what you did the first day:  · Hot cereal, plain toast, bread, rolls, crackers  · Plain noodles, rice, mashed potatoes, chicken noodle or rice soup  · Unsweetened canned fruit (avoid pineapple), bananas  · Limit fat intake to less than 15 grams per day by avoiding margarine, butter, oils, mayonnaise, sauces, gravies, fried foods, peanut butter, meat, poultry, and fish.  · Limit fiber. Avoid raw or cooked vegetables, fresh fruits (except bananas) and bran cereals.  · Limit caffeine and chocolate. No spices or seasonings except salt.  During the next 24  hours:  · Gradually resume a normal diet, as you feel better and your symptoms improve.  · If at any time your symptoms start getting worse again, go back to clear liquids until you feel better.  Food preparation  · If you have diarrhea, you should not prepare food for others. When preparing foods, wash your hands before and after.  · Wash your hands or use alcohol-based  after using cutting boards, countertops, and knives that have been in contact with raw food.  · Dry your hands with a single use towel.  · Keep uncooked meats away from cooked and ready-to-eat foods.    Follow-up care  Follow up with your healthcare provider, or as advised. Call if you don't get better in the next 2 to 3 days. If a stool (diarrhea) sample was taken, or cultures done, you will be told if they are positive, or if your treatment needs to be changed. You may call as directed for the results.  If X-rays were taken, you will be notified of any new findings that may affect your care  Call 911  Call 911 if any of these occur:  · Trouble breathing  · Chest pain  · Confusion  · Severe drowsiness or trouble awakening  · Fainting or loss of consciousness  · Rapid heart rate  · Seizure  · Stiff neck  · Severe weakness, dizziness, or lightheadedness  When to seek medical advice  Call your healthcare provider right away if any of these occur:  · Bloody or black vomit or stools  · Severe, steady abdominal pain or any abdominal pain that is getting worse  · Severe headache or stiff neck  · An inability to hold down even sips of liquids for more than 12 hours  · Vomiting that lasts more than 24 hours  · Diarrhea that lasts more than 24 hours  · Fever of 100.4°F (38.0°C) or higher, or as directed by your healthcare provider  · Yellowish color to your skin or the whites of your eyes  · Signs of dehydration, such as dry mouth, little urine (less than every 6 hours), or very dark urine  [employer] last reviewed this educational content on  6/1/2018  © 2328-6872 [employer]. All rights reserved. This information is not intended as a substitute for professional medical care. Always follow your healthcare professional's instructions.    Return to Work or release from isolation:  Per the most recent CDC recommendations you may return to work/activity and stop quarantine if ALL of the following conditions are met:    1. At least 24 hours have passed since recovery which is defined as resolution of fever without the use of fever-reducing medications       AND    2. 72 hours of  improvement in symptoms                                                          AND  3.  At least 10 days have passed since symptoms first appeared.               Patient Education     Coronavirus Disease 2019 (COVID-19): Overview  Coronavirus disease 2019 (COVID-19) is an illness that infects the lungs. It's caused by a type of coronavirus called SARS-CoV-2. There are many types of coronaviruses. They are a very common cause of colds and bronchitis. They can cause a lung infection called pneumonia. Symptoms can range from mild to severe. Some people have no symptoms. These viruses are also found in some animals.  The virus that causes COVID-19 changes (mutates) all the time. This is what all viruses do. It leads to different versions of a virus. These are called variants. COVID-19 variants may spread more easily from person to person. They may cause milder or more severe symptoms.   How the virus spreads is not yet fully known. It seems to spread and infect people easily. Some people may not know how they were infected. The virus may spread through droplets of fluid that a person coughs or sneezes into the air. It may be spread if you touch a surface with the virus on it and then touch your eyes, nose, or mouth. Handles, knobs, and objects may have the virus on them.     To help prevent spreading the infection, wash your hands often, or use an alcohol-based hand  sanitizer.   For the latest information from the CDC:     · Go to the CDC website  · Call 990-CWP-XING (925-107-0872)  What are the symptoms of COVID-19?  Some people have no symptoms. Some have mild symptoms. And other people may have severe symptoms. Types of symptoms can vary from person to person. They may appear 2 to 14 days after contact with the virus. They can include:  · Fever  · Chills  · Coughing  · Trouble breathing or feeling short of breath  · Sore throat  · Stuffy or runny nose  · Headache  · Body aches  · Tiredness  · Nausea, vomiting, diarrhea, or abdominal pain  · New loss of sense of smell or taste  Check your symptoms with the CDC’s Coronavirus Self-.  What are possible complications of COVID-19?  The virus can cause an infection in both lungs called pneumonia. In some cases, this can lead to death. Experts are still learning more about COVID-19 complications. More may be linked to the virus over time.  Some people are at higher risk for complications. This includes:  · Older adults  · People with heart or lung disease  · People with diabetes or kidney disease  · People with health conditions that suppress the immune system  · People who take medicines that suppress the immune system  Rarely, a child may have a severe complication. This is called multisystem inflammatory syndrome in children (MIS-C). MIS-C seems to be like Kawasaki disease. This is a rare illness that causes inflammation of blood vessels and body organs. It's not yet known if MIS-C happens only in children. Experts don’t know if adults are also at risk. It's also not known if it's related to COVID-19. Many children with MIS-C have tested positive for COVID-19. But not all have tested positive. Experts continue to study MIS-C.   How is COVID-19 diagnosed?  Your healthcare provider will ask:  · What symptoms you have  · Where you live  · If you’ve traveled recently  · If you’ve had contact with sick people   You may have 1  of these tests for COVID-19:  · Viral (molecular) test. You may also hear this called an RT-PCR test. Viral tests are very accurate. A viral test looks for the DNA of the SARS-CoV-2 virus. A viral test can also find COVID-19 variants. There are a few ways to do this. A swab may be wiped inside your nose or throat. Or a long swab may be put into your nose down to the back of your throat. Or a sample of your saliva may be taken. Your test results may be back in about 30 minutes. This depends on the type of test. Some tests must be sent to a lab. These can take several days for the results. Home test kits are now available. Some of these need a prescription. If you use a home kit, follow the instructions in the kit closely. Some kits show results quickly at home. Others must be sent to a lab for the results.  · Antigen test. This can find proteins from the SARS-CoV-2 virus. A swab may be wiped inside your nose or throat. Or a long swab may be put into your nose down to the back of your throat. Some results are back within 1 hour. This depends on the type of test. Positive results are very accurate. But false positive results can happen. This is more common in places where not many people have the virus. Antigen tests are more likely to miss a COVID-19 infection than a viral (molecular) test. If your antigen test is negative but you have symptoms of COVID-19, you may need to have a viral test.  If your provider thinks or confirms that you have COVID-19, you may have other tests. These tests may include:  · Antibody blood test. This type of test can show if you were infected with the virus in the past. It shows antibodies in the blood that give some immunity. The accuracy and availability of these tests vary. An antibody test may not be able to show if you have an infection right now. This is because it can take up to a few weeks for your body to make antibodies.  · Sputum culture. If you have a wet cough, you may be  asked to cough up a small sample of mucus (sputum) from your lungs. This is tested for the virus. It may be tested for pneumonia.  · Imaging tests. You may have a chest X-ray or CT scan.  Can you get COVID-19 again?  At this time, it's not clear if people can get COVID-19 more than once. The CDC notes that if a person has recovered from COVID-19 and is tested again within 3 months, they may still have low levels of the virus in their body. This means they test positive for COVID-19, but are not spreading it. Experts just don’t yet know how long immunity lasts after you have the virus. They don’t know if you can get COVID-19 again.  Vaccines for COVID-19  The FDA has approved several vaccines to help prevent COVID-19 or reduce its severity. No vaccine is 100% effective at preventing an illness. Getting a vaccine is important. It can help prevent the spread of COVID-19 and its variants. It can help reduce the severity of COVID-19 if you get it. This can stop you from getting seriously ill. It can keep you from needing to go to the hospital. One vaccine has been approved for people as young as 12. Pregnant or breastfeeding people can be vaccinated. Ask your healthcare provider which vaccine may be best for you.  The vaccines are given as a shot (injection). This is most often done in a muscle in the upper arm. There are 1-dose and 2-dose vaccines. For the 2-dose vaccine, the second dose is given several weeks after the first. An extra dose of the 2-dose vaccine may be needed for some people who have had a solid organ transplant or who have a very weak immune system. It's given at least 28 days after the second dose. Talk with your healthcare provider about your situation and risk.  For normally healthy people who have gotten the vaccine, data show that protection may lessen over time. Health experts are exploring the need for a booster shot about 8 months after getting the 1-dose vaccine or 2nd shot of the 2-dose  vaccine. Talk to your healthcare provider.  How is COVID-19 treated?  The most proven treatments right now are those to help your body while it fights the virus. This is known as supportive care. It includes:  · Getting rest. This helps your body fight the illness.  · Drinking fluids. Try to drink 6 to 8 glasses of fluids every day. Ask your provider which drinks are best for you. Don't have drinks with caffeine or alcohol.  · Taking over-the-counter (OTC) medicine. These are used to help ease pain and reduce fever. Ask your provider which OTC medicine is safe for you to use.  For severe illness, you may need to stay in the hospital. Your care may include:  · IV (intravenous) fluids. These are given through a vein. This helps to replace fluids in your body.  · Oxygen. You may be given supplemental oxygen. Or you may be put on a breathing machine (ventilator). This is done so you get enough oxygen in your body.  · Prone positioning. Your healthcare team may regularly turn you on your stomach. This is called prone positioning. It helps increase the amount of oxygen you get to your lungs. Follow their instructions on position changes while you're in the hospital. Also follow their advice on the best positions to help your breathing once you go home.  · Remdesivir. This is an antiviral medicine. The FDA has approved it for use on COVID-19. It works by stopping the spread of the SARS-CoV-2 virus in the body. It's approved only for people who are in the hospital. It's for people 12 years and older who weigh at least 88 pounds (40 kgs). In some cases, it may also be used for people younger than 12 years or who weigh less than 88 pounds (40 kgs).  Experts are researching other types of treatment. These are still being tested. They are not widely used. These include:  · COVID-19 convalescent plasma. Plasma is the liquid part of blood. People who had COVID-19 may be asked to donate plasma. This is called COVID-19 convalescent  plasma donation. The plasma may have antibodies that can help fight COVID-19 in people who are very ill with it. Experts don't know how well the plasma will work. They continue to research it. The FDA has approved it for emergency use in some people with severe COVID-19. Ask your provider if you qualify to donate.  · Monoclonal antibody therapy. The FDA approved this for emergency use in some people. They must have a positive COVID-19 viral test and have mild to moderate symptoms. They can’t be in the hospital. It's approved for people 12 years and older who weigh at least 88 pounds (40 kgs) and are at high risk for severe COVID-19 and a hospital stay. This includes people who are 65 years and older and people with some chronic conditions. This therapy is not approved for people who are in the hospital with COVID-19 or need oxygen. Your healthcare team will tell you if you qualify.  Are you at risk for COVID-19?  You are at risk for COVID-19 if any of these apply to you:  · You live in an area with cases of COVID-19  · You traveled to an area with cases of COVID-19  · You had close contact with someone who had COVID-19  Close contact means being within 6 feet. This contact happens for 15 minutes or more. It may be short times of contact that add up to at least 15 minutes over a 24-hour period.  Keep in mind that COVID-19 may be spread by people who do not show symptoms.  Last updated: 8/25/2021  [employer] last reviewed this educational content on 1/1/2020  © 0263-2648 [employer]. All rights reserved. This information is not intended as a substitute for professional medical care. Always follow your healthcare professional's instructions.            Patent

## 2024-02-17 NOTE — ED ADULT TRIAGE NOTE - CHIEF COMPLAINT QUOTE
Pt presents to the ED with complaints of fever and body aches for the past 4 days and chest discomfort that started yesterday. Pt last took Tylenol at 1pm.

## 2024-02-17 NOTE — ED PROVIDER NOTE - NSFOLLOWUPINSTRUCTIONS_ED_ALL_ED_FT
Enfermedades virales en los adultos  Viral Illness, Adult  Los virus son microbios diminutos que entran en el organismo de tremayne persona y causan enfermedades. Hay muchos tipos de virus diferentes y causan muchas clases de enfermedades. Las enfermedades virales pueden ser leves o graves. Pueden afectar diferentes partes del cuerpo.    Entre las afecciones a corto plazo causadas por virus, se incluyen los resfríos y la gripe. Entre las afecciones a andrei plazo causadas por un virus, incluyen el herpes, la culebrilla y la infección por VIH (virus de inmunodeficiencia humana). Se york identificado unos pocos virus asociados con determinados tipos de cáncer.    ¿Cuáles son las causas?  Muchos tipos de virus pueden causar enfermedades. Los virus invaden las células del organismo, se multiplican y provocan que las células infectadas funcionen de manera anormal o mueran. Cuando estas células mueren, liberan más virus. Cuando esto ocurre, aparecen síntomas de la enfermedad, y el virus sigue diseminándose a otras células. Si el virus asume la función de la célula, puede hacer que esta se divida y prolifere de manera descontrolada. [place] ocurre cuando un virus causa cáncer.    Los diferentes virus ingresan al organismo de distintas formas. Puede contraer un virus de la siguiente manera:  Al ingerir alimentos o beber agua que york entrado en contacto con el virus (están contaminados).  Al inhalar gotitas que tremayne persona infectada liberó en el aire al toser o estornudar.  Al tocar tremayne superficie contaminada con el virus y luego llevarse la mano a la boca, la nariz o los ojos.  Al ser yuriy por un insecto o mordido por un animal que son portadores del virus.  Al tener contacto sexual con tremayne persona infectada por el virus.  Al tener contacto con calvin o líquidos que contienen el virus, ya sea a través de un maddie abierto o kimberly tremayne transfusión.  Si el virus ingresa al organismo, el sistema de defensa del cuerpo (sistema inmunitario) intentará combatirlo. Puede correr un riesgo más alto de tener tremayne enfermedad viral si tiene el sistema inmunitario debilitado.    ¿Cuáles son los signos o síntomas?  Puede tener los siguientes síntomas, dependiendo del tipo de virus y de la ubicación de las células que invade:  Virus del resfrío y de la gripe:  Fiebre.  Dolor de malcolm.  Dolor de garganta.  Alyson musculares.  Nariz tapada (congestión nasal).  Tos.  Virus del aparato digestivo (gastrointestinales):  Fiebre.  Dolor en el abdomen.  Náuseas.  Diarrea.  Virus hepáticos (hepatitis):  Pérdida del apetito.  Cansancio.  La piel o las partes salvador de los ojos se ponen ousmane (ictericia).  Virus del cerebro y la médula stanton:  Fiebre.  Dolor de malcolm.  Rigidez en el baldev.  Náuseas y vómitos.  Confusión o somnolencia.  Virus de la piel:  Verrugas.  Picazón.  Erupción cutánea.  Virus de transmisión sexual:  Secreción.  Hinchazón.  Enrojecimiento.  Erupción cutánea.  ¿Cómo se diagnostica?  Esta afección se puede diagnosticar en función de lo siguiente:  Síntomas.  Antecedentes médicos.  Examen físico.  Análisis de calvin, tremayne muestra de mucosidad de los pulmones (muestra de esputo), muestra de heces o un hisopado de líquidos corporales o tremayne llaga de la piel (lesión).  ¿Cómo se trata?  Los virus pueden ser difíciles de tratar porque se hospedan en las células. Los antibióticos no tratan los virus porque estos medicamentos no llegan al interior de las células. El tratamiento de tremayne enfermedad viral puede incluir lo siguiente:  Descansar y beber abundantes líquidos.  Medicamentos para aliviar los síntomas. Estos pueden incluir medicamentos de venta chvaa para el dolor y la fiebre, medicamentos para la tos o la congestión y medicamentos para aliviar la diarrea.  Medicamentos antivirales. Estos medicamentos están disponibles únicamente para ciertos tipos de virus.  Algunas enfermedades virales pueden evitarse con vacunas. Un ejemplo frecuente es la vacuna antigripal.    Siga estas instrucciones en juan casa:  Medicamentos    Use los medicamentos de venta chava y los recetados solamente gerardo se lo haya indicado el médico.  Si le recetaron un medicamento antiviral, tómelo gerardo se lo haya indicado el médico. No deje de damian el antiviral aunque comience a sentirse mejor.  Infórmese sobre cuándo los antibióticos son necesarios y cuándo no. Los antibióticos no combaten a los virus. Si tiene tremayne infección viral y el médico juan luis que también tiene tremayne infección bacteriana, o que está en riesgo de contraerla, iman vez le recete un antibiótico.  No solicite tremayne receta para antibióticos si le york diagnosticado tremayne enfermedad viral. Los antibióticos no harán que se cure más rápidamente.  Damian antibióticos con frecuencia cuando no son necesarios puede derivar en resistencia a los antibióticos. Cuando esto ocurre, el medicamento pierde juan eficacia contra las bacterias que normalmente combate.  Indicaciones generales    Annalise suficiente líquido para mantener la orina de color amarillo pálido.  Descanse todo lo que pueda.  Retome marli actividades normales según lo indicado por el médico. Pregúntele al médico qué actividades son seguras para usted.  Concurra a todas las visitas de seguimiento gerardo se lo haya indicado el médico. [place] es importante.  ¿Cómo se previene?    Para reducir el riesgo de tener tremayne enfermedad viral:  Lávese las isra frecuentemente con agua y jabón kimberly al menos 20 segundos. Use desinfectante para isra si no dispone de agua y jabón.  Evite tocarse la nariz, los ojos y la boca, sobre todo si no se lavó las isra recientemente.  Si un miembro de la kurt tiene tremayne infección viral, limpie todas las superficies de la casa que puedan andry estado en contacto con el virus. Use [place] y jabón. También puede usar lejía con agua agregada (diluido).  Manténgase lejos de las personas enfermas con síntomas de tremayne infección viral.  No comparta objetos tales gerardo cepillos de dientes y botellas de agua con otras personas.  Mantenga las vacunas al día. [place] incluye recibir la vacuna antigripal todos los años.  Siga tremayne dieta saludable y descanse lo suficiente.  Comuníquese con un médico si:  Tiene síntomas de tremayne enfermedad viral que no desaparecen.  Los síntomas regresan después de andry desaparecido.  Marli síntomas empeoran.  Solicite ayuda de inmediato si tiene:  Dificultad para respirar.  Dolor de malcolm intenso o rigidez en el baldev.  Vómitos abundantes o dolor en el abdomen.  Estos síntomas pueden representar un problema grave que constituye tremayne emergencia. No espere a tom si los síntomas desaparecen. Solicite atención médica de inmediato. Comuníquese con el servicio de emergencias de juan localidad (911 en los [place]). No conduzca por marli propios medios hasta el hospital.    Resumen  Los virus son tipos de microbios que entran en el organismo de tremayne persona y causan enfermedades. Las enfermedades virales pueden ser leves o graves. Pueden afectar diferentes partes del cuerpo.  Los virus pueden ser difíciles de tratar. Hay medicamentos para aliviar los síntomas y hay algunos medicamentos antivirales.  Si le recetaron un medicamento antiviral, tómelo gerardo se lo haya indicado el médico. No deje de damian el antiviral aunque comience a sentirse mejor.  Comuníquese con un médico si tiene síntomas de tremayne enfermedad viral que no desaparecen.  Esta información no tiene gerardo fin reemplazar el consejo del médico. Asegúrese de hacerle al médico cualquier pregunta que tenga.    Viral Illness, Adult  Viruses are tiny germs that can get into a person's body and cause illness. There are many different types of viruses, and they cause many types of illness. Viral illnesses can range from mild to severe. They can affect various parts of the body.    Short-term conditions that are caused by a virus include colds and the flu (influenza). Long-term conditions that are caused by a virus include herpes, shingles, and HIV (human immunodeficiency virus) infection. A few viruses have been linked to certain cancers.    What are the causes?  Many types of viruses can cause illness. Viruses invade cells in your body, multiply, and cause the infected cells to work abnormally or die. When these cells die, they release more of the virus. When this happens, you develop symptoms of the illness, and the virus continues to spread to other cells. If the virus takes over the function of the cell, it can cause the cell to divide and grow out of control. This happens when a virus causes cancer.    Different viruses get into the body in different ways. You can get a virus by:  Swallowing food or water that has come in contact with the virus (is contaminated).  Breathing in droplets that have been coughed or sneezed into the air by an infected person.  Touching a surface that has been contaminated with the virus and then touching your eyes, nose, or mouth.  Being bitten by an insect or animal that carries the virus.  Having sexual contact with a person who is infected with the virus.  Being exposed to blood or fluids that contain the virus, either through an open cut or during a transfusion.  If a virus enters your body, your body's defense system (immune system) will try to fight the virus. You may be at higher risk for a viral illness if your immune system is weak.    What are the signs or symptoms?  You may have these symptoms, depending on the type of virus and the location of the cells that it invades:  Cold and flu viruses:  Fever.  Headache.  Sore throat.  Muscle aches.  Stuffy nose (nasal congestion).  Cough.  Digestive system (gastrointestinal) viruses:  Fever.  Pain in the abdomen.  Nausea.  Diarrhea.  Liver viruses (hepatitis):  Loss of appetite.  Tiredness.  Skin or the white parts of your eyes turning yellow (jaundice).  Brain and spinal cord viruses:  Fever.  Headache.  Stiff neck.  Nausea and vomiting.  Confusion or sleepiness.  Skin viruses:  Warts.  Itching.  Rash.  Sexually transmitted viruses:  Discharge.  Swelling.  Redness.  Rash.  How is this diagnosed?  This condition may be diagnosed based on one or more of the following:  Symptoms.  Medical history.  Physical exam.  Blood test, sample of mucus from your lungs (sputum sample), stool sample, or a swab of body fluids or a skin sore (lesion).  How is this treated?  Viruses can be hard to treat because they live within cells. Antibiotic medicines do not treat viruses because these medicines do not get inside cells. Treatment for a viral illness may include:  Resting and drinking plenty of fluids.  Medicines to relieve symptoms. These can include over-the-counter medicine for pain and fever, medicines for cough or congestion, and medicines to relieve diarrhea.  Antiviral medicines. These medicines are available only for certain types of viruses.  Some viral illnesses can be prevented with vaccinations. A common example is the flu shot.    Follow these instructions at home:  Medicines    Take over-the-counter and prescription medicines only as told by your health care provider.  If you were prescribed an antiviral medicine, take it as told by your health care provider. Do not stop taking the antiviral even if you start to feel better.  Be aware of when antibiotics are needed and when they are not needed. Antibiotics do not treat viruses. You may get an antibiotic if your health care provider thinks that you may have, or are at risk for, a bacterial infection and you have a viral infection.  Do not ask for an antibiotic prescription if you have been diagnosed with a viral illness. Antibiotics will not make your illness go away faster.  Frequently taking antibiotics when they are not needed can lead to antibiotic resistance. When this develops, the medicine no longer works against the bacteria that it normally fights.  General instructions    Drink enough fluids to keep your urine pale yellow.  Rest as much as possible.  Return to your normal activities as told by your health care provider. Ask your health care provider what activities are safe for you.  Keep all follow-up visits as told by your health care provider. This is important.  How is this prevented?    To reduce your risk of viral illness:  Wash your hands often with soap and water for at least 20 seconds. If soap and water are not available, use hand .  Avoid touching your nose, eyes, and mouth, especially if you have not washed your hands recently.  If anyone in your household has a viral infection, clean all household surfaces that may have been in contact with the virus. Use soap and hot water. You may also use bleach that you have added water to (diluted).  Stay away from people who are sick with symptoms of a viral infection.  Do not share items such as toothbrushes and water bottles with other people.  Keep your vaccinations up to date. This includes getting a yearly flu shot.  Eat a healthy diet and get plenty of rest.  Contact a health care provider if:  You have symptoms of a viral illness that do not go away.  Your symptoms come back after going away.  Your symptoms get worse.  Get help right away if you have:  Trouble breathing.  A severe headache or a stiff neck.  Severe vomiting or pain in your abdomen.  These symptoms may represent a serious problem that is an emergency. Do not wait to see if the symptoms will go away. Get medical help right away. Call your local emergency services (911 in the [place] Do not drive yourself to the hospital.    Summary  Viruses are types of germs that can get into a person's body and cause illness. Viral illnesses can range from mild to severe. They can affect various parts of the body.  Viruses can be hard to treat. There are medicines to relieve symptoms, and there are some antiviral medicines.  If you were prescribed an antiviral medicine, take it as told by your health care provider. Do not stop taking the antiviral even if you start to feel better.  Contact a health care provider if you have symptoms of a viral illness that do not go away.  This information is not intended to replace advice given to you by your health care provider. Make sure you discuss any questions you have with your health care provider.

## 2024-02-17 NOTE — ED ADULT NURSE NOTE - OBJECTIVE STATEMENT
Patient is a 58yoM presenting to the ED with flu like symptoms for the past 4 days. Pt denies pmh, axox3, spont breathing on RA. States that he has been having full body weakness and generalized fatigue. Reports that patient has also been having a fever, last took tylenol 1pm today, endorsing dry cough, denies chest pain/sob.

## 2024-02-17 NOTE — ED PROVIDER NOTE - PROGRESS NOTE DETAILS
pt feeling much better. lactate improved after ivf  I have discussed the discharge plan with the patient. The patient agrees with the plan, as discussed.  The patient understands Emergency Department diagnosis is a preliminary diagnosis often based on limited information and that the patient must adhere to the follow-up plan as discussed.  The patient understands that if the symptoms worsen the patient may return to the Emergency Department at any time for further evaluation and treatment.

## 2024-02-17 NOTE — ED PROVIDER NOTE - PATIENT PORTAL LINK FT
You can access the FollowMyHealth Patient Portal offered by [facility] by registering at the following website: [email or web address]. By joining [employer]’s FollowMyHealth portal, you will also be able to view your health information using other applications (apps) compatible with our system.

## 2024-02-18 VITALS
DIASTOLIC BLOOD PRESSURE: 60 MMHG | RESPIRATION RATE: 18 BRPM | HEART RATE: 83 BPM | OXYGEN SATURATION: 98 % | SYSTOLIC BLOOD PRESSURE: 107 MMHG | TEMPERATURE: 98 F

## 2024-02-23 LAB
CULTURE RESULTS: SIGNIFICANT CHANGE UP
CULTURE RESULTS: SIGNIFICANT CHANGE UP
SPECIMEN SOURCE: SIGNIFICANT CHANGE UP
SPECIMEN SOURCE: SIGNIFICANT CHANGE UP

## 2024-06-03 NOTE — ED ADULT NURSE NOTE - TEMPLATE LIST FOR HEAD TO TOE ASSESSMENT
Thank you for trusting us with your care!     If you need to contact us for questions about:  Symptoms, Scheduling & Medical Questions; Non-urgent (2-3 day response) Bernard message, Urgent (needing response today) [phone] (if after 3:30pm next day response)   Prescriptions: Please call your Pharmacy   Billing: Mary [phone] or [facility]:[phone]    
General

## 2024-09-01 ENCOUNTER — EMERGENCY (EMERGENCY)
Facility: HOSPITAL | Age: 59
LOS: 1 days | Discharge: HOME | End: 2024-09-01
Attending: EMERGENCY MEDICINE | Admitting: EMERGENCY MEDICINE
Payer: COMMERCIAL

## 2024-09-01 VITALS
TEMPERATURE: 98 F | SYSTOLIC BLOOD PRESSURE: 136 MMHG | HEART RATE: 72 BPM | OXYGEN SATURATION: 99 % | DIASTOLIC BLOOD PRESSURE: 82 MMHG | RESPIRATION RATE: 17 BRPM

## 2024-09-01 VITALS
TEMPERATURE: 98 F | SYSTOLIC BLOOD PRESSURE: 159 MMHG | HEIGHT: 63 IN | WEIGHT: 139.99 LBS | DIASTOLIC BLOOD PRESSURE: 96 MMHG | RESPIRATION RATE: 18 BRPM | HEART RATE: 65 BPM | OXYGEN SATURATION: 99 %

## 2024-09-01 LAB
ANION GAP SERPL CALC-SCNC: 10 MMOL/L — SIGNIFICANT CHANGE UP (ref 5–17)
APPEARANCE UR: ABNORMAL
BASOPHILS # BLD AUTO: 0.05 K/UL — SIGNIFICANT CHANGE UP (ref 0–0.2)
BASOPHILS NFR BLD AUTO: 0.5 % — SIGNIFICANT CHANGE UP (ref 0–2)
BILIRUB UR-MCNC: NEGATIVE — SIGNIFICANT CHANGE UP
BUN SERPL-MCNC: 20 MG/DL — SIGNIFICANT CHANGE UP (ref 7–23)
CALCIUM SERPL-MCNC: 8.5 MG/DL — SIGNIFICANT CHANGE UP (ref 8.4–10.5)
CHLORIDE SERPL-SCNC: 108 MMOL/L — SIGNIFICANT CHANGE UP (ref 96–108)
CO2 SERPL-SCNC: 27 MMOL/L — SIGNIFICANT CHANGE UP (ref 22–31)
COLOR SPEC: YELLOW — SIGNIFICANT CHANGE UP
CREAT SERPL-MCNC: 0.79 MG/DL — SIGNIFICANT CHANGE UP (ref 0.5–1.3)
DIFF PNL FLD: ABNORMAL
EGFR: 104 ML/MIN/1.73M2 — SIGNIFICANT CHANGE UP
EOSINOPHIL # BLD AUTO: 0.08 K/UL — SIGNIFICANT CHANGE UP (ref 0–0.5)
EOSINOPHIL NFR BLD AUTO: 0.8 % — SIGNIFICANT CHANGE UP (ref 0–6)
GLUCOSE SERPL-MCNC: 160 MG/DL — HIGH (ref 70–99)
GLUCOSE UR QL: NEGATIVE MG/DL — SIGNIFICANT CHANGE UP
HCT VFR BLD CALC: 39.7 % — SIGNIFICANT CHANGE UP (ref 39–50)
HGB BLD-MCNC: 13.2 G/DL — SIGNIFICANT CHANGE UP (ref 13–17)
IMM GRANULOCYTES NFR BLD AUTO: 0.2 % — SIGNIFICANT CHANGE UP (ref 0–0.9)
KETONES UR-MCNC: ABNORMAL MG/DL
LEUKOCYTE ESTERASE UR-ACNC: NEGATIVE — SIGNIFICANT CHANGE UP
LYMPHOCYTES # BLD AUTO: 2.61 K/UL — SIGNIFICANT CHANGE UP (ref 1–3.3)
LYMPHOCYTES # BLD AUTO: 26 % — SIGNIFICANT CHANGE UP (ref 13–44)
MCHC RBC-ENTMCNC: 30.3 PG — SIGNIFICANT CHANGE UP (ref 27–34)
MCHC RBC-ENTMCNC: 33.2 GM/DL — SIGNIFICANT CHANGE UP (ref 32–36)
MCV RBC AUTO: 91.3 FL — SIGNIFICANT CHANGE UP (ref 80–100)
MONOCYTES # BLD AUTO: 0.61 K/UL — SIGNIFICANT CHANGE UP (ref 0–0.9)
MONOCYTES NFR BLD AUTO: 6.1 % — SIGNIFICANT CHANGE UP (ref 2–14)
NEUTROPHILS # BLD AUTO: 6.66 K/UL — SIGNIFICANT CHANGE UP (ref 1.8–7.4)
NEUTROPHILS NFR BLD AUTO: 66.4 % — SIGNIFICANT CHANGE UP (ref 43–77)
NITRITE UR-MCNC: NEGATIVE — SIGNIFICANT CHANGE UP
NRBC # BLD: 0 /100 WBCS — SIGNIFICANT CHANGE UP (ref 0–0)
PH UR: 5.5 — SIGNIFICANT CHANGE UP (ref 5–8)
PLATELET # BLD AUTO: 211 K/UL — SIGNIFICANT CHANGE UP (ref 150–400)
POTASSIUM SERPL-MCNC: 3.6 MMOL/L — SIGNIFICANT CHANGE UP (ref 3.5–5.3)
POTASSIUM SERPL-SCNC: 3.6 MMOL/L — SIGNIFICANT CHANGE UP (ref 3.5–5.3)
PROT UR-MCNC: 30 MG/DL
RBC # BLD: 4.35 M/UL — SIGNIFICANT CHANGE UP (ref 4.2–5.8)
RBC # FLD: 13.1 % — SIGNIFICANT CHANGE UP (ref 10.3–14.5)
SODIUM SERPL-SCNC: 145 MMOL/L — SIGNIFICANT CHANGE UP (ref 135–145)
SP GR SPEC: >1.03 — HIGH (ref 1–1.03)
UROBILINOGEN FLD QL: 1 MG/DL — SIGNIFICANT CHANGE UP (ref 0.2–1)
WBC # BLD: 10.03 K/UL — SIGNIFICANT CHANGE UP (ref 3.8–10.5)
WBC # FLD AUTO: 10.03 K/UL — SIGNIFICANT CHANGE UP (ref 3.8–10.5)

## 2024-09-01 PROCEDURE — 80048 BASIC METABOLIC PNL TOTAL CA: CPT

## 2024-09-01 PROCEDURE — 96374 THER/PROPH/DIAG INJ IV PUSH: CPT

## 2024-09-01 PROCEDURE — 99284 EMERGENCY DEPT VISIT MOD MDM: CPT | Mod: 25

## 2024-09-01 PROCEDURE — 81001 URINALYSIS AUTO W/SCOPE: CPT

## 2024-09-01 PROCEDURE — 96361 HYDRATE IV INFUSION ADD-ON: CPT

## 2024-09-01 PROCEDURE — 74176 CT ABD & PELVIS W/O CONTRAST: CPT | Mod: 26,MC

## 2024-09-01 PROCEDURE — 99284 EMERGENCY DEPT VISIT MOD MDM: CPT

## 2024-09-01 PROCEDURE — 36415 COLL VENOUS BLD VENIPUNCTURE: CPT

## 2024-09-01 PROCEDURE — 74176 CT ABD & PELVIS W/O CONTRAST: CPT | Mod: MC

## 2024-09-01 PROCEDURE — 85025 COMPLETE CBC W/AUTO DIFF WBC: CPT

## 2024-09-01 RX ORDER — TAMSULOSIN HYDROCHLORIDE 0.4 MG/1
1 CAPSULE ORAL
Qty: 7 | Refills: 0
Start: 2024-09-01 | End: 2024-09-07

## 2024-09-01 RX ORDER — TAMSULOSIN HYDROCHLORIDE 0.4 MG/1
0.4 CAPSULE ORAL ONCE
Refills: 0 | Status: COMPLETED | OUTPATIENT
Start: 2024-09-01 | End: 2024-09-01

## 2024-09-01 RX ORDER — SODIUM CHLORIDE 9 MG/ML
1000 INJECTION INTRAMUSCULAR; INTRAVENOUS; SUBCUTANEOUS ONCE
Refills: 0 | Status: COMPLETED | OUTPATIENT
Start: 2024-09-01 | End: 2024-09-01

## 2024-09-01 RX ORDER — OXYCODONE AND ACETAMINOPHEN 7.5; 325 MG/1; MG/1
1 TABLET ORAL
Qty: 12 | Refills: 0
Start: 2024-09-01

## 2024-09-01 RX ORDER — IBUPROFEN 600 MG
1 TABLET ORAL
Qty: 20 | Refills: 0
Start: 2024-09-01

## 2024-09-01 RX ORDER — KETOROLAC TROMETHAMINE 30 MG/ML
15 INJECTION, SOLUTION INTRAMUSCULAR ONCE
Refills: 0 | Status: DISCONTINUED | OUTPATIENT
Start: 2024-09-01 | End: 2024-09-01

## 2024-09-01 RX ADMIN — TAMSULOSIN HYDROCHLORIDE 0.4 MILLIGRAM(S): 0.4 CAPSULE ORAL at 22:35

## 2024-09-01 RX ADMIN — KETOROLAC TROMETHAMINE 15 MILLIGRAM(S): 30 INJECTION, SOLUTION INTRAMUSCULAR at 21:11

## 2024-09-01 RX ADMIN — KETOROLAC TROMETHAMINE 15 MILLIGRAM(S): 30 INJECTION, SOLUTION INTRAMUSCULAR at 20:56

## 2024-09-01 RX ADMIN — SODIUM CHLORIDE 1000 MILLILITER(S): 9 INJECTION INTRAMUSCULAR; INTRAVENOUS; SUBCUTANEOUS at 20:55

## 2024-09-01 RX ADMIN — SODIUM CHLORIDE 1000 MILLILITER(S): 9 INJECTION INTRAMUSCULAR; INTRAVENOUS; SUBCUTANEOUS at 22:33

## 2024-09-01 NOTE — ED ADULT NURSE NOTE - OBJECTIVE STATEMENT
Patient presents to the ED complaining of left sided flank pain going to the left side of his abdomen for the past few days. Patient reports decreased urination. No blood in the urine. No nausea or vomiting. History of kidney stones.

## 2024-09-01 NOTE — ED PROVIDER NOTE - NEUROLOGICAL, MLM
The patient is a 71y Male complaining of abdominal pain. Alert and oriented, no focal deficits, no motor or sensory deficits.

## 2024-09-01 NOTE — ED PROVIDER NOTE - OBJECTIVE STATEMENT
history of kidney stones, here with left flank pain radiating to left lower abdomen x 4 days. Associated with decreased urine output. Denies fever/chills, vomiting, dysuria. Prior stone felt similar, had to have lithotripsy. Didn't take anything for pain

## 2024-09-01 NOTE — ED ADULT NURSE NOTE - NSFALLUNIVINTERV_ED_ALL_ED
Bed/Stretcher in lowest position, wheels locked, appropriate side rails in place/Call bell, personal items and telephone in reach/Instruct patient to call for assistance before getting out of bed/chair/stretcher/Non-slip footwear applied when patient is off stretcher/[place] to call system/Physically safe environment - no spills, clutter or unnecessary equipment/Purposeful proactive rounding/Room/bathroom lighting operational, light cord in reach

## 2024-09-01 NOTE — ED PROVIDER NOTE - CARE PROVIDER_API CALL
Derrick Chavez.  Urology  [address]  [place] [address]  Phone: [phone]  Fax: [phone]  Follow Up Time:

## 2024-09-01 NOTE — ED PROVIDER NOTE - CLINICAL SUMMARY MEDICAL DECISION MAKING FREE TEXT BOX
left flank pain concerning for renal colic. no signs infection/ pyelo clinically. labs/ct ordered. toradol for pain. left flank pain concerning for renal colic. no signs infection/ pyelo clinically. labs/ct ordered. toradol for pain.  ct w 3mm stone. normal renal function, no infection. pain controlled. will dc w flomax/pain control/ urology referral

## 2024-09-01 NOTE — ED PROVIDER NOTE - NSFOLLOWUPINSTRUCTIONS_ED_ALL_ED_FT
Please see urologist for followup.  Call for appointment.  If you have any problems with followup, please call the ED Referral Coordinator at [phone].  Return to the ER if symptoms worsen or other concerns. Do not take narcotics (percocet) and drive or operate machinery       Kidney Stones    WHAT YOU NEED TO KNOW:    Kidney stones form in the urinary system when the water and waste in your urine are out of balance. When this happens, certain types of waste crystals separate from the urine. The crystals build up and form kidney stones. You may have more than one kidney stone.     Kidney Stones          DISCHARGE INSTRUCTIONS:    Return to the emergency department if:   •You have vomiting that is not relieved by medicine.          Contact your healthcare provider if:   •You have a fever.       •You have trouble passing urine.      •You see blood in your urine.      •You have severe pain.      •You have any questions or concerns about your condition or care.      Medicines:   •NSAIDs, such as ibuprofen, help decrease swelling, pain, and fever. This medicine is available with or without a doctor's order. NSAIDs can cause stomach bleeding or kidney problems in certain people. If you take blood thinner medicine, always ask your healthcare provider if NSAIDs are safe for you. Always read the medicine label and follow directions.      •Prescription pain medicine may be given. Ask your healthcare provider how to take this medicine safely. Some prescription pain medicines contain acetaminophen. Do not take other medicines that contain acetaminophen without talking to your healthcare provider. Too much acetaminophen may cause liver damage. Prescription pain medicine may cause constipation. Ask your healthcare provider how to prevent or treat constipation.       •Medicines to balance your electrolytes may be needed.       •Take your medicine as directed. Contact your healthcare provider if you think your medicine is not helping or if you have side effects. Tell him or her if you are allergic to any medicine. Keep a list of the medicines, vitamins, and herbs you take. Include the amounts, and when and why you take them. Bring the list or the pill bottles to follow-up visits. Carry your medicine list with you in case of an emergency.      Follow up with your healthcare provider as directed: You may need to return for more tests. Write down your questions so you remember to ask them during your visits.    What you can do to manage kidney stones:   •Drink more liquids. Your healthcare provider may tell you to drink at least 8 to 12 (eight-ounce) cups of liquids each day. This helps flush out the kidney stones when you urinate. Water is the best liquid to drink.      •Strain your urine every time you go to the bathroom. Urinate through a strainer or a piece of thin cloth to catch the stones. Take the stones to your healthcare provider so they can be sent to the lab for tests. This will help your healthcare providers plan the best treatment for you.  Look for Stones in the Filter           •Eat a variety of healthy foods. Healthy foods include fruits, vegetables, whole-grain breads, low-fat dairy products, beans, and fish. You may need to limit how much sodium (salt) or protein you eat. Ask for information about the best foods for you.      •Stay active. Your stones may pass more easily if you stay active. Exercise can also help you manage your weight. Ask about the best activities for you.      After you pass the kidney stones: Your healthcare provider may order a 24-hour urine test. Results from a 24-hour urine test will help your healthcare provider plan ways to prevent more stones from forming. Your healthcare provider will give you more instructions.

## 2024-09-01 NOTE — ED PROVIDER NOTE - PATIENT PORTAL LINK FT
You can access the FollowMyHealth Patient Portal offered by [facility] by registering at the following website: [email or web address]. By joining [employer]’s FollowMyHealth portal, you will also be able to view your health information using other applications (apps) compatible with our system.

## 2024-09-05 DIAGNOSIS — R10.32 LEFT LOWER QUADRANT PAIN: ICD-10-CM

## 2024-09-05 DIAGNOSIS — N20.0 CALCULUS OF KIDNEY: ICD-10-CM

## 2025-04-24 NOTE — ED PROVIDER NOTE - NS ED MD DISPO DISCHARGE CCDA
Patient is with PT/OT and is unable to be seen by Cardiac Rehabilitation at this time. Will follow to progress.    Patient/Caregiver provided printed discharge information.

## 2025-06-23 NOTE — ED ADULT NURSE NOTE - CAS TRG GENERAL NORM CIRC DET
Goal Outcome Evaluation:  Plan of Care Reviewed With: patient      Patient A&O x4. Patient denies pain and nausea. NPO since midnight for US. No reports or signs of bleeding this shift. Patient on room air. Tele on, sinus rhythm.                                     Strong peripheral pulses

## 2025-07-25 ENCOUNTER — EMERGENCY (EMERGENCY)
Facility: HOSPITAL | Age: 60
LOS: 1 days | End: 2025-07-25
Attending: EMERGENCY MEDICINE | Admitting: EMERGENCY MEDICINE
Payer: COMMERCIAL

## 2025-07-25 VITALS
TEMPERATURE: 99 F | HEART RATE: 102 BPM | HEIGHT: 63 IN | SYSTOLIC BLOOD PRESSURE: 147 MMHG | WEIGHT: 145.06 LBS | RESPIRATION RATE: 16 BRPM | OXYGEN SATURATION: 97 % | DIASTOLIC BLOOD PRESSURE: 87 MMHG

## 2025-07-25 PROCEDURE — 99284 EMERGENCY DEPT VISIT MOD MDM: CPT

## 2025-07-25 RX ORDER — ACETAMINOPHEN 500 MG/5ML
650 LIQUID (ML) ORAL ONCE
Refills: 0 | Status: COMPLETED | OUTPATIENT
Start: 2025-07-25 | End: 2025-07-25

## 2025-07-25 RX ORDER — KETOROLAC TROMETHAMINE 30 MG/ML
15 INJECTION, SOLUTION INTRAMUSCULAR; INTRAVENOUS ONCE
Refills: 0 | Status: DISCONTINUED | OUTPATIENT
Start: 2025-07-25 | End: 2025-07-25

## 2025-07-25 NOTE — ED ADULT TRIAGE NOTE - WEIGHT IN LBS
145 Call placed to Edmar, he stated that he is not able to get his INR today and will obtain his INR tomorrow. Advised to take 7.5mg today and the [facility] will watch for results and call him tomorrow.  Thanked writer for calling.

## 2025-07-26 VITALS
RESPIRATION RATE: 17 BRPM | DIASTOLIC BLOOD PRESSURE: 65 MMHG | TEMPERATURE: 98 F | HEART RATE: 70 BPM | SYSTOLIC BLOOD PRESSURE: 111 MMHG | OXYGEN SATURATION: 97 %

## 2025-07-26 LAB
ANION GAP SERPL CALC-SCNC: 11 MMOL/L — SIGNIFICANT CHANGE UP (ref 5–17)
APPEARANCE UR: CLEAR — SIGNIFICANT CHANGE UP
BASOPHILS # BLD AUTO: 0.04 K/UL — SIGNIFICANT CHANGE UP (ref 0–0.2)
BASOPHILS NFR BLD AUTO: 0.6 % — SIGNIFICANT CHANGE UP (ref 0–2)
BILIRUB UR-MCNC: NEGATIVE — SIGNIFICANT CHANGE UP
BUN SERPL-MCNC: 13 MG/DL — SIGNIFICANT CHANGE UP (ref 7–23)
CALCIUM SERPL-MCNC: 8.5 MG/DL — SIGNIFICANT CHANGE UP (ref 8.4–10.5)
CHLORIDE SERPL-SCNC: 105 MMOL/L — SIGNIFICANT CHANGE UP (ref 96–108)
CO2 SERPL-SCNC: 22 MMOL/L — SIGNIFICANT CHANGE UP (ref 22–31)
COLOR SPEC: SIGNIFICANT CHANGE UP
CREAT SERPL-MCNC: 0.79 MG/DL — SIGNIFICANT CHANGE UP (ref 0.5–1.3)
DIFF PNL FLD: ABNORMAL
EGFR: 104 ML/MIN/1.73M2 — SIGNIFICANT CHANGE UP
EGFR: 104 ML/MIN/1.73M2 — SIGNIFICANT CHANGE UP
EOSINOPHIL # BLD AUTO: 0 K/UL — SIGNIFICANT CHANGE UP (ref 0–0.5)
EOSINOPHIL NFR BLD AUTO: 0 % — SIGNIFICANT CHANGE UP (ref 0–6)
FLUAV AG NPH QL: SIGNIFICANT CHANGE UP
FLUBV AG NPH QL: SIGNIFICANT CHANGE UP
GLUCOSE SERPL-MCNC: 121 MG/DL — HIGH (ref 70–99)
GLUCOSE UR QL: NEGATIVE MG/DL — SIGNIFICANT CHANGE UP
HCT VFR BLD CALC: 41.3 % — SIGNIFICANT CHANGE UP (ref 39–50)
HGB BLD-MCNC: 13.6 G/DL — SIGNIFICANT CHANGE UP (ref 13–17)
IMM GRANULOCYTES # BLD AUTO: 0.02 K/UL — SIGNIFICANT CHANGE UP (ref 0–0.07)
IMM GRANULOCYTES NFR BLD AUTO: 0.3 % — SIGNIFICANT CHANGE UP (ref 0–0.9)
KETONES UR QL: ABNORMAL MG/DL
LEUKOCYTE ESTERASE UR-ACNC: NEGATIVE — SIGNIFICANT CHANGE UP
LYMPHOCYTES # BLD AUTO: 0.87 K/UL — LOW (ref 1–3.3)
LYMPHOCYTES NFR BLD AUTO: 12.9 % — LOW (ref 13–44)
MCHC RBC-ENTMCNC: 29.6 PG — SIGNIFICANT CHANGE UP (ref 27–34)
MCHC RBC-ENTMCNC: 32.9 G/DL — SIGNIFICANT CHANGE UP (ref 32–36)
MCV RBC AUTO: 90 FL — SIGNIFICANT CHANGE UP (ref 80–100)
MONOCYTES # BLD AUTO: 0.48 K/UL — SIGNIFICANT CHANGE UP (ref 0–0.9)
MONOCYTES NFR BLD AUTO: 7.1 % — SIGNIFICANT CHANGE UP (ref 2–14)
NEUTROPHILS # BLD AUTO: 5.34 K/UL — SIGNIFICANT CHANGE UP (ref 1.8–7.4)
NEUTROPHILS NFR BLD AUTO: 79.1 % — HIGH (ref 43–77)
NITRITE UR-MCNC: NEGATIVE — SIGNIFICANT CHANGE UP
NRBC # BLD AUTO: 0 K/UL — SIGNIFICANT CHANGE UP (ref 0–0)
NRBC # FLD: 0 K/UL — SIGNIFICANT CHANGE UP (ref 0–0)
NRBC BLD AUTO-RTO: 0 /100 WBCS — SIGNIFICANT CHANGE UP (ref 0–0)
PH UR: 5.5 — SIGNIFICANT CHANGE UP (ref 5–8)
PLATELET # BLD AUTO: 188 K/UL — SIGNIFICANT CHANGE UP (ref 150–400)
PMV BLD: 11 FL — SIGNIFICANT CHANGE UP (ref 7–13)
POTASSIUM SERPL-MCNC: 3.5 MMOL/L — SIGNIFICANT CHANGE UP (ref 3.5–5.3)
POTASSIUM SERPL-SCNC: 3.5 MMOL/L — SIGNIFICANT CHANGE UP (ref 3.5–5.3)
PROT UR-MCNC: SIGNIFICANT CHANGE UP MG/DL
RBC # BLD: 4.59 M/UL — SIGNIFICANT CHANGE UP (ref 4.2–5.8)
RBC # FLD: 13.2 % — SIGNIFICANT CHANGE UP (ref 10.3–14.5)
RSV RNA NPH QL NAA+NON-PROBE: SIGNIFICANT CHANGE UP
SARS-COV-2 RNA SPEC QL NAA+PROBE: SIGNIFICANT CHANGE UP
SODIUM SERPL-SCNC: 138 MMOL/L — SIGNIFICANT CHANGE UP (ref 135–145)
SOURCE RESPIRATORY: SIGNIFICANT CHANGE UP
SP GR SPEC: >1.03 — HIGH (ref 1–1.03)
UROBILINOGEN FLD QL: 1 MG/DL — SIGNIFICANT CHANGE UP (ref 0.2–1)
WBC # BLD: 6.75 K/UL — SIGNIFICANT CHANGE UP (ref 3.8–10.5)
WBC # FLD AUTO: 6.75 K/UL — SIGNIFICANT CHANGE UP (ref 3.8–10.5)

## 2025-07-26 PROCEDURE — 85025 COMPLETE CBC W/AUTO DIFF WBC: CPT

## 2025-07-26 PROCEDURE — 80048 BASIC METABOLIC PNL TOTAL CA: CPT

## 2025-07-26 PROCEDURE — 36415 COLL VENOUS BLD VENIPUNCTURE: CPT

## 2025-07-26 PROCEDURE — 87637 SARSCOV2&INF A&B&RSV AMP PRB: CPT

## 2025-07-26 PROCEDURE — 96374 THER/PROPH/DIAG INJ IV PUSH: CPT

## 2025-07-26 PROCEDURE — 93971 EXTREMITY STUDY: CPT

## 2025-07-26 PROCEDURE — 81001 URINALYSIS AUTO W/SCOPE: CPT

## 2025-07-26 PROCEDURE — 93971 EXTREMITY STUDY: CPT | Mod: 26,LT

## 2025-07-26 PROCEDURE — 99284 EMERGENCY DEPT VISIT MOD MDM: CPT | Mod: 25

## 2025-07-26 RX ADMIN — KETOROLAC TROMETHAMINE 15 MILLIGRAM(S): 30 INJECTION, SOLUTION INTRAMUSCULAR; INTRAVENOUS at 00:01

## 2025-07-26 RX ADMIN — Medication 1000 MILLILITER(S): at 00:01

## 2025-07-26 RX ADMIN — Medication 650 MILLIGRAM(S): at 00:01

## 2025-07-26 NOTE — ED PROVIDER NOTE - NSFOLLOWUPINSTRUCTIONS_ED_ALL_ED_FT
Enfermedades virales en los adultos    Los virus son microbios diminutos que entran en el organismo de tremayne persona y causan enfermedades. Hay muchos tipos diferentes de virus. Y causan muchos tipos de enfermedades. Las enfermedades virales pueden ser leves o graves. Pueden afectar diferentes partes del cuerpo.    Entre las afecciones a corto plazo causadas por virus, se incluyen los resfríos, la gripe y los virus estomacales.     ¿Cuáles son las causas?  Muchos tipos de virus pueden causar enfermedades. Los virus ingresan en las células del organismo, se multiplican y provocan que las células infectadas funcionen de manera diferente o mueran. Cuando estas células mueren, liberan más virus. Cuando esto ocurre, aparecen los síntomas de la enfermedad, y el virus se disemina a otras células. Si el virus domina el funcionamiento de la célula, puede hacer que esta se divida y prolifere de manera descontrolada. Pettit ocurre cuando un virus causa cáncer.    Los diferentes virus ingresan al organismo de distintas formas. Puede contraer un virus de la siguiente manera:  Al ingerir alimentos o beber agua que york entrado en contacto con el virus.  Al inhalar gotitas que tremayne persona infectada liberó en el aire al toser o estornudar.  Al tocar tremayne superficie que tenga el virus y luego llevarse la mano a la boca, la nariz o los ojos.  Al ser yuriy por un insecto o mordido por un animal que son portadores del virus.  Al tener contacto sexual con tremayne persona infectada por el virus.  Al tener contacto con calvin o líquidos que contienen el virus, ya sea a través de un maddie abierto o kimberly tremayne transfusión.  Si el virus ingresa al organismo, el sistema del cuerpo que combate las enfermedades (sistema inmunitario) intentará combatirlo. Puede correr un riesgo más alto de tener tremayne enfermedad viral si tiene el sistema inmunitario debilitado.    ¿Cuáles son los signos o síntomas?  Los síntomas dependen del tipo de virus y de la ubicación de las células en las que ingresa. Entre los síntomas se pueden incluir los siguientes:  Con los virus del resfrío y de la gripe:  Fiebre.  Dolor de malcolm.  Dolor de garganta.  Alyson musculares.  Nariz tapada (congestión nasal).  Tos.  Con los virus estomacales (gastrointestinales):  Fiebre.  Dolor en el abdomen.  Náuseas o vómitos.  Diarrea.  Con los virus hepáticos (hepatitis):  Pérdida del apetito.  Cansancio.  La piel o las partes salvador de los ojos se ponen ousmane (ictericia).  Con los virus del cerebro y la médula stanton:  Fiebre.  Dolor de malcolm.  Rigidez en el baldev.  Náuseas y vómitos.  Confusión o somnolencia.  Con los virus de la piel:  Verrugas.  Picazón.  Erupción cutánea.  Con los virus de transmisión sexual:  Secreción.  Hinchazón.  Enrojecimiento.  Erupción cutánea.    ¿Cómo se diagnostica?  Esta afección se puede diagnosticar en función de tremayne o más de las siguientes evaluaciones:  Los síntomas y antecedentes médicos.  Un examen físico.  Pruebas, gerardo, por ejemplo:  Análisis de calvin.  Análisis de tremayne muestra de mucosidad de los pulmones (muestra de esputo).  Análisis de tremayne muestra de materia fecal (heces).  Análisis de un hisopado de líquidos corporales o tremayne llaga en la piel (lesión).    ¿Cómo se trata?  Los virus pueden ser difíciles de tratar porque se hospedan en las células. Los antibióticos no tratan los virus porque estos medicamentos no llegan al interior de las células. El tratamiento de tremayne enfermedad viral puede incluir lo siguiente:  Descansar y beber mucho líquido.  Medicamentos para tratar los síntomas. Estos pueden incluir medicamentos de venta chava para el dolor y la fiebre, medicamentos para la tos o la congestión y medicamentos para la diarrea.  Medicamentos antivirales. Estos medicamentos están disponibles únicamente para ciertos tipos de virus.  Algunas enfermedades virales pueden evitarse con vacunas. Un ejemplo frecuente es la vacuna antigripal.    Siga estas indicaciones en juan casa:  Medicamentos    Use los medicamentos de venta chava y los recetados solamente gerardo se lo haya indicado el médico.  Si le recetaron un medicamento antiviral, tómelo gerardo se lo haya indicado el médico. No deje de damian el antiviral aunque comience a sentirse mejor.  Sepa cuándo los antibióticos son necesarios y cuándo no. Los antibióticos no combaten a los virus. Si tiene tremayne infección viral y el médico juan luis que también tiene tremayne infección bacteriana, o que está en riesgo de contraerla, iman vez le recete un antibiótico.  No solicite tremayne receta para antibióticos si le york diagnosticado tremayne enfermedad viral. Los antibióticos no harán que se cure más rápidamente.  Damian antibióticos cuando no son necesarios puede derivar en resistencia a los antibióticos. Cuando esto ocurre, el medicamento pierde juan eficacia contra las bacterias que normalmente combate.    Indicaciones generales    Annalise suficiente líquido para mantener el pis (la orina) de color amarillo pálido.  Descanse todo lo que pueda.  Retome marli actividades normales gerardo se lo haya indicado el médico. Pregúntele al médico qué actividades son seguras para usted.    ¿Cómo se previene?    Para reducir el riesgo de contraer otra enfermedad viral:  Lávese las isra frecuentemente con agua y jabón kimberly al menos 20 segundos. Use desinfectante para isra si no dispone de agua y jabón.  Evite tocarse la nariz, los ojos y la boca, sobre todo si no se lavó las isra recientemente.  Si un miembro de la kurt tiene tremayne infección viral, limpie todas las superficies de la casa que puedan andry estado en contacto con el virus. Use [place] y jabón. También puede usar tremayne solución de preparación comercial que contenga lejía.  Manténgase lejos de las personas enfermas con síntomas de tremayne infección viral.  No comparta objetos tales gerardo cepillos de dientes y botellas de agua con otras personas.  Mantenga las vacunas al día. Pettit incluye recibir la vacuna antigripal todos los años.  Siga tremayne dieta saludable y descanse lo suficiente.    Comuníquese con un médico si:  Tiene síntomas de tremayne enfermedad viral que no desaparecen.  Los síntomas regresan después de andry desaparecido.  Marli síntomas empeoran.    Solicite ayuda de inmediato si:  Tiene dificultad para respirar.  Siente un dolor de malcolm intenso o rigidez en el baldev.  Tiene vómitos o dolor abdominal intensos.  Estos síntomas pueden indicar tremayne emergencia. Solicite ayuda de inmediato. Llame al 911.  No espere a tom si los síntomas desaparecen.  No conduzca por marli propios medios hasta el hospital.  Esta información no tiene gerardo fin reemplazar el consejo del médico. Asegúrese de hacerle al médico cualquier pregunta que tenga.    Viral Illness, Adult  Viruses are tiny germs that can get into a person's body and cause illness. There are many different types of viruses. And they cause many types of illness. Viral illnesses can range from mild to severe. They can affect various parts of the body.    Short-term conditions that are caused by a virus include colds and flu (influenza) and stomach viruses.     What are the causes?  Many types of viruses can cause illness. Viruses get into cells in your body, multiply, and cause the infected cells to work differently or die. When these cells die, they release more of the virus. When this happens, you get symptoms of the illness and the virus spreads to other cells. If the virus takes over how the cell works, it can cause the cell to divide and grow out of control. This happens when a virus causes cancer.    Different viruses get into the body in different ways. You can get a virus by:  Swallowing food or water that has come in contact with the virus.  Breathing in droplets that have been coughed or sneezed into the air by an infected person.  Touching a surface that has the virus on it and then touching your eyes, nose, or mouth.  Being bitten by an insect or animal that carries the virus.  Having sexual contact with a person who is infected with the virus.  Being exposed to blood or fluids that contain the virus, either through an open cut or during a transfusion.  If a virus enters your body, your body's disease-fighting system (immune system) will try to fight the virus. You may be at higher risk for a viral illness if your immune system is weak.    What are the signs or symptoms?  Symptoms depend on the type of virus and the location of the cells that it gets into. Symptoms can include:  For cold and flu viruses:  Fever.  Headache.  Sore throat.  Muscle aches.  Stuffy nose (nasal congestion).  Cough.  For stomach (gastrointestinal) viruses:  Fever.  Pain in the abdomen.  Nausea or vomiting.  Diarrhea.  For liver viruses (hepatitis):  Loss of appetite.  Feeling tired.  Skin or the white parts of your eyes turning yellow (jaundice).  For brain and spinal cord viruses:  Fever.  Headache.  Stiff neck.  Nausea and vomiting.  Confusion or being sleepy.  For skin viruses:  Warts.  Itching.  Rash.  For sexually transmitted viruses:  Discharge.  Swelling.  Redness.  Rash.    How is this diagnosed?  This condition may be diagnosed based on one or more of these:  Your symptoms and medical history.  A physical exam.  Tests, such as:  Blood tests.  Tests on a sample of mucus from your lungs (sputum sample).  Tests on a poop (stool) sample.  Tests on a swab of body fluids or a skin sore (lesion).    How is this treated?  Viruses can be hard to treat because they live within cells. Antibiotics do not treat viruses because these medicines do not get inside cells. Treatment for a viral illness may include:  Resting and drinking a lot of fluids.  Medicines to treat symptoms. These can include over-the-counter medicine for pain and fever, medicines for cough or congestion, and medicines for diarrhea.  Antiviral medicines. These medicines are available only for certain types of viruses.  Some viral illnesses can be prevented with vaccinations. A common example is the flu shot.    Follow these instructions at home:  Medicines    Take over-the-counter and prescription medicines only as told by your health care provider.  If you were prescribed an antiviral medicine, take it as told by your provider. Do not stop taking the antiviral even if you start to feel better.  Know when antibiotics are needed and when they are not needed. Antibiotics do not treat viruses. You may get an antibiotic if your provider thinks that you may have, or are at risk for, a bacterial infection and you have a viral infection.  Do not ask for an antibiotic prescription if you have been diagnosed with a viral illness. Antibiotics will not make your illness go away faster.  Taking antibiotics when they are not needed can lead to antibiotic resistance. When this develops, the medicine no longer works against the bacteria that it normally fights.    General instructions    Drink enough fluids to keep your pee (urine) pale yellow.  Rest as much as possible.  Return to your normal activities as told by your provider. Ask your provider what activities are safe for you.    How is this prevented?    To lower your risk of getting another viral illness:  Wash your hands often with soap and water for at least 20 seconds. If soap and water are not available, use hand .  Avoid touching your nose, eyes, and mouth, especially if you have not washed your hands recently.  If anyone in your household has a viral infection, clean all household surfaces that may have been in contact with the virus. Use soap and hot water. You may also use a commercially prepared, bleach-containing solution.  Stay away from people who are sick with symptoms of a viral infection.  Do not share items such as toothbrushes and water bottles with other people.  Keep your vaccinations up to date. This includes getting a yearly flu shot.  Eat a healthy diet and get plenty of rest.    Contact a health care provider if:  You have symptoms of a viral illness that do not go away.  Your symptoms come back after going away.  Your symptoms get worse.    Get help right away if:  You have trouble breathing.  You have a severe headache or a stiff neck.  You have severe vomiting or pain in your abdomen.  These symptoms may be an emergency. Get help right away. Call 911.  Do not wait to see if the symptoms will go away.  Do not drive yourself to the hospital.  This information is not intended to replace advice given to you by your health care provider. Make sure you discuss any questions you have with your health care provider.

## 2025-07-26 NOTE — ED ADULT NURSE NOTE - OBJECTIVE STATEMENT
pt presents to the ED with BL leg pain, also c/o fevers and chills, patient is axox3, spont breathing on RA, ambulated well. Pt denies pmh, states that since yesterday he has been having subjective fevers and diarrhea and full body aches, denies chest pain/sob, denies coughs, denies dizziness.

## 2025-07-26 NOTE — ED PROVIDER NOTE - PATIENT PORTAL LINK FT
You can access the FollowMyHealth Patient Portal offered by [facility] by registering at the following website: [email or web address]. By joining [email or web address]’s FollowMyHealth portal, you will also be able to view your health information using other applications (apps) compatible with our system.

## 2025-07-26 NOTE — ED PROVIDER NOTE - MUSCULOSKELETAL MINIMAL EXAM
LLE - mild ttp lower leg, no erythema, no edema, compartments soft, no knee swelling, FROM/neck supple

## 2025-07-26 NOTE — ED PROVIDER NOTE - CLINICAL SUMMARY MEDICAL DECISION MAKING FREE TEXT BOX
bodyaches, diarrhea, no abd pain, doubt acute surgical abd at this time, LLE pain, no evidence of cellulitis, no evidence of compartment syndrome. possible viral illness  -check labs  -ivf, tylenol, toradol  -US r/o dvt

## 2025-07-26 NOTE — ED PROVIDER NOTE - OBJECTIVE STATEMENT
59M no PMH c/o feeling unwell for past 2-3 days. states feeling achy over whole body. some diarrhea this morning. no vomiting. no abd pain. no chest pain, no sob. no cough. no recent travel. no sick contacts. no HA. pt states L leg has been hurting him for past 2-3 wks. no trauma. no swelling, no redness.

## 2025-07-26 NOTE — ED PROVIDER NOTE - PROGRESS NOTE DETAILS
no DVT, no resp distress, no abd pain. likely viral syndrome  I have discussed the discharge plan with the patient. The patient agrees with the plan, as discussed.  The patient understands Emergency Department diagnosis is a preliminary diagnosis often based on limited information and that the patient must adhere to the follow-up plan as discussed.  The patient understands that if the symptoms worsen the patient may return to the Emergency Department at any time for further evaluation and treatment.

## 2025-07-29 DIAGNOSIS — M79.662 PAIN IN LEFT LOWER LEG: ICD-10-CM

## 2025-07-29 DIAGNOSIS — R19.7 DIARRHEA, UNSPECIFIED: ICD-10-CM
